# Patient Record
Sex: FEMALE | Race: ASIAN | Employment: FULL TIME | URBAN - METROPOLITAN AREA
[De-identification: names, ages, dates, MRNs, and addresses within clinical notes are randomized per-mention and may not be internally consistent; named-entity substitution may affect disease eponyms.]

---

## 2021-08-18 ENCOUNTER — VIRTUAL VISIT (OUTPATIENT)
Dept: FAMILY MEDICINE CLINIC | Age: 41
End: 2021-08-18
Payer: COMMERCIAL

## 2021-08-18 DIAGNOSIS — Z12.31 BREAST CANCER SCREENING BY MAMMOGRAM: ICD-10-CM

## 2021-08-18 DIAGNOSIS — Z13.1 SCREENING FOR DIABETES MELLITUS: ICD-10-CM

## 2021-08-18 DIAGNOSIS — E89.0 POSTOPERATIVE HYPOTHYROIDISM: Primary | ICD-10-CM

## 2021-08-18 DIAGNOSIS — Z85.850 HISTORY OF THYROID CANCER: ICD-10-CM

## 2021-08-18 DIAGNOSIS — Z30.431 IUD CHECK UP: ICD-10-CM

## 2021-08-18 DIAGNOSIS — Z13.220 LIPID SCREENING: ICD-10-CM

## 2021-08-18 PROCEDURE — 99204 OFFICE O/P NEW MOD 45 MIN: CPT | Performed by: FAMILY MEDICINE

## 2021-08-18 RX ORDER — LEVOTHYROXINE SODIUM 112 UG/1
112 TABLET ORAL DAILY
Qty: 30 TABLET | Refills: 0 | Status: SHIPPED | OUTPATIENT
Start: 2021-08-18 | End: 2021-10-04 | Stop reason: SDUPTHER

## 2021-08-18 RX ORDER — LEVOTHYROXINE SODIUM 112 UG/1
112 TABLET ORAL DAILY
COMMUNITY
End: 2021-08-18 | Stop reason: SDUPTHER

## 2021-08-18 SDOH — ECONOMIC STABILITY: FOOD INSECURITY: WITHIN THE PAST 12 MONTHS, THE FOOD YOU BOUGHT JUST DIDN'T LAST AND YOU DIDN'T HAVE MONEY TO GET MORE.: NEVER TRUE

## 2021-08-18 SDOH — ECONOMIC STABILITY: FOOD INSECURITY: WITHIN THE PAST 12 MONTHS, YOU WORRIED THAT YOUR FOOD WOULD RUN OUT BEFORE YOU GOT MONEY TO BUY MORE.: NEVER TRUE

## 2021-08-18 ASSESSMENT — PATIENT HEALTH QUESTIONNAIRE - PHQ9
SUM OF ALL RESPONSES TO PHQ QUESTIONS 1-9: 0
2. FEELING DOWN, DEPRESSED OR HOPELESS: 0
SUM OF ALL RESPONSES TO PHQ QUESTIONS 1-9: 0
SUM OF ALL RESPONSES TO PHQ9 QUESTIONS 1 & 2: 0
SUM OF ALL RESPONSES TO PHQ QUESTIONS 1-9: 0
1. LITTLE INTEREST OR PLEASURE IN DOING THINGS: 0

## 2021-08-18 ASSESSMENT — ENCOUNTER SYMPTOMS
CONSTIPATION: 0
RHINORRHEA: 0
DIARRHEA: 0
SORE THROAT: 0
ABDOMINAL PAIN: 0
CHEST TIGHTNESS: 0
SHORTNESS OF BREATH: 0

## 2021-08-18 ASSESSMENT — SOCIAL DETERMINANTS OF HEALTH (SDOH): HOW HARD IS IT FOR YOU TO PAY FOR THE VERY BASICS LIKE FOOD, HOUSING, MEDICAL CARE, AND HEATING?: NOT HARD AT ALL

## 2021-08-18 NOTE — PROGRESS NOTES
Subjective:   Patient ID: Drake Russell is a 35 y.o. female here today to establish care. HPI by clinical support staff:   Chief Complaint   Patient presents with    New Patient     Patient just moved here, just wants a general physical and she needs a referral to a endoconologist      Preliminary data above this line collected by clinical support staff.    ______________________________________________________________________  HPI by Provider:   HPI   Patient presents via virtual visit to establish care. History reviewed and updated with patient today. Reports a history of thyroid cancer (unsure which kind)  in 2014 surgically removed status post radioactive ablation in [place]. Currently on levothyroxine 112 mcg daily, looking to establish with endocrinology  And also has Mirena in place would like to get a gynecologist. Requesting thorough physical plus labs. Never had a mammogram- due for pap smear- never abnormal.    Data above this line collected by Provider. Patient's medications, allergies, past medical, surgical, social and family histories were reviewed and updated as appropriate. Patient Care Team:  Malik Ramírez MD as PCP - General (Family Medicine)    No Known Allergies  No current outpatient medications on file prior to visit. No current facility-administered medications on file prior to visit. Review of Systems   Constitutional: Negative for activity change, appetite change, fatigue and fever. HENT: Negative for congestion, rhinorrhea and sore throat. Respiratory: Negative for chest tightness and shortness of breath. Cardiovascular: Negative for chest pain, palpitations and leg swelling. Gastrointestinal: Negative for abdominal pain, constipation and diarrhea. Genitourinary: Negative for dysuria and frequency. Musculoskeletal: Negative for arthralgias. Neurological: Negative for dizziness, weakness and headaches.    Psychiatric/Behavioral: Negative for hallucinations. All other systems reviewed and are negative. ROS above this line reviewed by Provider. Objective: There were no vitals taken for this visit. Physical Exam  Nursing note reviewed. Constitutional:       General: She is not in acute distress. Appearance: Normal appearance. She is normal weight. She is not ill-appearing, toxic-appearing or diaphoretic. Comments: Well groomed   HENT:      Head: Normocephalic and atraumatic. Pulmonary:      Effort: Pulmonary effort is normal.      Comments: Speaking in full sentences  Musculoskeletal:      Cervical back: Normal range of motion. Neurological:      Mental Status: She is alert. Psychiatric:         Mood and Affect: Mood normal.         Behavior: Behavior normal.         Thought Content: Thought content normal.       No results found for: WBC, HGB, HCT, MCV, PLT  No results found for: NA, K, BUN, CREATININE, GLUCOSE, CALCIUM, BILITOT, ALKPHOS, AST, ALT, GFRAA  No results found for: TSHFT4, TSH, FT3  No results found for: LABA1C  No results found for: EAG  No results found for: CHOL, TRIG, HDL, LDLCHOLESTEROL, CHOLHDLRATIO  No results found for: LABMICR, HNNE18CPZ  No results found for: VITD25  Assessment and Plan:   1. Postoperative hypothyroidism  Status post thyroidectomy and radioablation  in 2014- unsure which type of cancer.   - CBC Auto Differential; Future  - Comprehensive Metabolic Panel; Future  - TSH WITH REFLEX TO FT4; Future  - Raphael Plata MD, Endocrinology, [facility]    2. History of thyroid cancer  Diagnosed in 2014-Status post thyroidectomy and radioablation  in 2014- unsure which type of cancer. Will bring records from [place] and [address] [place] [address]. - CBC Auto Differential; Future  - Comprehensive Metabolic Panel; Future    3. IUD check up  Mirena in situ x 3-4 years. - Korey Lezama MD, Gynecology, [facility]    4.  Breast cancer screening by mammogram  - NICK DIGITAL SCREEN W OR WO CAD BILATERAL; Future    5. Lipid screening  - Lipid Panel; Future    6. Screening for diabetes mellitus  Positive family history. - Hemoglobin A1C; Future       Misty Palm  is a 35 y.o. female being evaluated by a Virtual Visit (video visit) encounter to address concerns as mentioned above. A caregiver was present when appropriate. Due to this being a TeleHealth encounter (During [facility] public health emergency), evaluation of the following organ systems was limited: Vitals/Constitutional/EENT/Resp/CV/GI//MS/Neuro/Skin/Heme-Lymph-Imm. Pursuant to the emergency declaration under the [address] [place] [address], [address] [place] [address] authority and the [employer] and [employer] Act, this Virtual Visit was conducted with patient's (and/or legal guardian's) consent, to reduce the patient's risk of exposure to COVID-19 and provide necessary medical care. The patient (and/or legal guardian) has also been advised to contact this office for worsening conditions or problems, and seek emergency medical treatment and/or call 911 if deemed necessary. Patient identification was verified at the start of the visit: Yes    Total time spent for this encounter: Not billed by time    Services were provided through a video synchronous discussion virtually to substitute for in-person clinic visit. Patient  located at their individual homes. This chart note was prepared using a voice recognition dictation program. This note was reviewed for accuracy; however, addition, deletion and sound-alike word errors may occur. If there are any questions regarding this chart note, please contact the originating provider. Electronically signed by   Marilee Watts MD  8/18/2021   12:30 PM    No follow-ups on file.

## 2021-10-04 ENCOUNTER — VIRTUAL VISIT (OUTPATIENT)
Dept: ENDOCRINOLOGY | Age: 41
End: 2021-10-04
Payer: COMMERCIAL

## 2021-10-04 DIAGNOSIS — E89.0 POST-SURGICAL HYPOTHYROIDISM: Primary | ICD-10-CM

## 2021-10-04 DIAGNOSIS — Z92.3 HX OF RADIOACTIVE IODINE THYROID ABLATION: ICD-10-CM

## 2021-10-04 DIAGNOSIS — Z85.850 HISTORY OF THYROID CANCER: ICD-10-CM

## 2021-10-04 PROCEDURE — 99244 OFF/OP CNSLTJ NEW/EST MOD 40: CPT | Performed by: INTERNAL MEDICINE

## 2021-10-04 RX ORDER — LEVOTHYROXINE SODIUM 0.12 MG/1
125 TABLET ORAL DAILY
Qty: 90 TABLET | Refills: 1 | Status: SHIPPED | OUTPATIENT
Start: 2021-10-04 | End: 2021-12-07 | Stop reason: SDUPTHER

## 2021-10-04 NOTE — PROGRESS NOTES
Patient ID: Justo Erickson is a 35 y.o. female    Chief Complaint: post surgical hypothyroidism, thyroid cancer      Referred by Dr. Ion Lincoln MD     Pursuant to the emergency declaration under the [facility], [facility] waiver authority and the [employer] and [employer] Act this visit was conducted after obtaining verbal consent, with the use of [employer]. me interactive audio and video telecommunications system that permits real time communication between the patient and provider to substitute for an in-person clinic visit to reduce the patient's risk of exposure to COVID-19 and provide necessary medical care. Because this was a Telehealth visit, evaluation of the following organ systems was limited: Vitals, Constitutional, EENT, Resp, CV, GI, , MS, Neuro, Skin. Heme. Lymph, Imm. Justo Erickson was at home. Provider was at [facility] office. No one else was involved. The patient has also been advised to contact this office for worsening conditions or problems, and seek emergency medical treatment and/or call 911 if deemed necessary. HPI:   Justo Erickson is here for initial evaluation of postoperative hypothyroidism. 2014: She had total thyroidectomy in [place] (first affiliated [address])   LEE in 2014     She was seeing endocrinologist in [place]. Move din Nov 2020. Takes Levothyroxine 112 mcg daily in morning empty stomach with water and waits for 30 minutes.      Energy levels are low   Weight loss on intermittent fasting   She thinks she has tremors and poor sleep   No heat / cold intolerance   Mild depression /anxiety   Denies excessive sweating, tremors, palpitations    No neck pain, denies compressive neck symptoms   No menstrual cycles - she has mirena   No plans to conceive     Family history of thyroid disorder: None     No recent iodine loading in form of contrast material for diagnostic studies/cardiac cath  No Food supplements, herbs or medications including Biotin  No recent URTI      The following portions of the patient's history were reviewed and updated as appropriate:       Family History   Problem Relation Age of Onset    Asthma Mother     Diabetes Mother         Steroid induced    High Blood Pressure Father     No Known Problems Brother     No Known Problems Maternal Grandmother     No Known Problems Maternal Grandfather     No Known Problems Paternal Grandmother     No Known Problems Paternal Grandfather     No Known Problems Son     No Known Problems Daughter             Social History     Socioeconomic History    Marital status:      Spouse name: Not on file    Number of children: Not on file    Years of education: Not on file    Highest education level: Not on file   Occupational History    Not on file   Tobacco Use    Smoking status: Never Smoker    Smokeless tobacco: Never Used   Vaping Use    Vaping Use: Never used   Substance and Sexual Activity    Alcohol use: Never    Drug use: Never    Sexual activity: Yes     Partners: Male     Birth control/protection: I.U.D. Other Topics Concern    Not on file   Social History Narrative    Not on file     Social Determinants of Health     Financial Resource Strain: Low Risk     Difficulty of Paying Living Expenses: Not hard at all   Food Insecurity: No Food Insecurity    Worried About Running Out of Food in the Last Year: Never true    [address] in the Last Year: Never true   Transportation Needs:     Lack of Transportation (Medical):      Lack of Transportation (Non-Medical):    Physical Activity:     Days of Exercise per Week:     Minutes of Exercise per Session:    Stress:     Feeling of Stress :    Social Connections:     Frequency of Communication with Friends and Family:     Frequency of Social Gatherings with Friends and Family:     Attends Baptism Services:     Active Member of Clubs or Organizations:     Attends Club or Organization Meetings:     Marital Status:    Intimate Partner Violence:     Fear of Current or Ex-Partner:     Emotionally Abused:     Physically Abused:     Sexually Abused:            Past Medical History:   Diagnosis Date    Acquired hypothyroidism 2014    Thyroidectomy    History of thyroid cancer 2014    Hx of radioactive iodine thyroid ablation     IUD (intrauterine device) in place     Kidney stone          Past Surgical History:   Procedure Laterality Date    LITHOTRIPSY Left 2020    THYROIDECTOMY           No Known Allergies        Current Outpatient Medications:     levothyroxine (SYNTHROID) 125 MCG tablet, Take 1 tablet by mouth Daily, Disp: 90 tablet, Rfl: 1      Review of Systems:  Constitutional: Negative for fever, chills. HENT: Negative for congestion, ear pain, rhinorrhea,  sore throat and trouble swallowing. Eyes: Negative for photophobia, redness, itching. Respiratory: Negative for cough, shortness of breath and sputum. Cardiovascular: Negative for chest pain and leg swelling. Gastrointestinal: Negative for nausea, vomiting, abdominal pain, diarrhea, constipation. Endocrine: Negative for polydipsia, polyphagia and polyuria. Genitourinary: Negative for dysuria, urgency, frequency, hematuria and flank pain. Musculoskeletal: Negative for myalgias, back pain, arthralgias. Skin/Nail: Negative for rash, itching. Normal nails. Neurological: Negative for seizures, light-headedness, numbness and headaches. Hematological/ Lymph nodes: Negative for adenopathy. Does not bruise/bleed easily. Psychiatric/Behavioral: Negative for suicidal ideas and decreased concentration. Physical Exam:  There were no vitals taken for this visit.         Lab Review:    Orders Only on 08/20/2021   Component Date Value Ref Range Status    Cholesterol, Total 08/20/2021 220* 0 - 199 mg/dL Final    Triglycerides 08/20/2021 118  0 - 150 mg/dL Final    HDL 08/20/2021 60  40 - 60 mg/dL Final    LDL Calculated 08/20/2021 136* <100 mg/dL Final    VLDL Cholesterol Calculated 08/20/2021 24  Not Established mg/dL Final    TSH Reflex FT4 08/20/2021 2.68  0.27 - 4.20 uIU/mL Final    Hemoglobin A1C 08/20/2021 5.5  See comment % Final    eAG 08/20/2021 111.2  mg/dL Final    Sodium 08/20/2021 140  136 - 145 mmol/L Final    Potassium 08/20/2021 4.1  3.5 - 5.1 mmol/L Final    Chloride 08/20/2021 103  99 - 110 mmol/L Final    CO2 08/20/2021 21  21 - 32 mmol/L Final    Anion Gap 08/20/2021 16  3 - 16 Final    Glucose 08/20/2021 73  70 - 99 mg/dL Final    BUN 08/20/2021 9  7 - 20 mg/dL Final    CREATININE 08/20/2021 0.7  0.6 - 1.1 mg/dL Final    GFR Non- 08/20/2021 >60  >60 Final    GFR  08/20/2021 >60  >60 Final    Calcium 08/20/2021 8.6  8.3 - 10.6 mg/dL Final    Total Protein 08/20/2021 7.0  6.4 - 8.2 g/dL Final    Albumin 08/20/2021 4.3  3.4 - 5.0 g/dL Final    Albumin/Globulin Ratio 08/20/2021 1.6  1.1 - 2.2 Final    Total Bilirubin 08/20/2021 1.0  0.0 - 1.0 mg/dL Final    Alkaline Phosphatase 08/20/2021 66  40 - 129 U/L Final    ALT 08/20/2021 16  10 - 40 U/L Final    AST 08/20/2021 16  15 - 37 U/L Final    Globulin 08/20/2021 2.7  g/dL Final    WBC 08/20/2021 7.8  4.0 - 11.0 K/uL Final    RBC 08/20/2021 4.51  4.00 - 5.20 M/uL Final    Hemoglobin 08/20/2021 12.9  12.0 - 16.0 g/dL Final    Hematocrit 08/20/2021 38.1  36.0 - 48.0 % Final    MCV 08/20/2021 84.5  80.0 - 100.0 fL Final    MCH 08/20/2021 28.7  26.0 - 34.0 pg Final    MCHC 08/20/2021 33.9  31.0 - 36.0 g/dL Final    RDW 08/20/2021 13.5  12.4 - 15.4 % Final    Platelets 53/53/5037 263  135 - 450 K/uL Final    MPV 08/20/2021 7.7  5.0 - 10.5 fL Final    Neutrophils % 08/20/2021 59.4  % Final    Lymphocytes % 08/20/2021 29.2  % Final    Monocytes % 08/20/2021 7.0  % Final    Eosinophils % 08/20/2021 3.6  % Final    Basophils % 08/20/2021 0.8  % Final    Neutrophils Absolute 08/20/2021 4.6  1.7 - 7.7 K/uL Final    Lymphocytes Absolute 08/20/2021 2.3  1.0 - 5.1 K/uL Final    Monocytes Absolute 08/20/2021 0.5  0.0 - 1.3 K/uL Final    Eosinophils Absolute 08/20/2021 0.3  0.0 - 0.6 K/uL Final    Basophils Absolute 08/20/2021 0.1  0.0 - 0.2 K/uL Final        No results found. Assessment/Plan:     Jamie Fairbanks was seen today for consultation and hypothyroidism. Diagnoses and all orders for this visit:    Post-surgical hypothyroidism  -     levothyroxine (SYNTHROID) 125 MCG tablet; Take 1 tablet by mouth Daily  -     TSH without Reflex; Future  -     T4, Free; Future  -     Thyroglobulin; Future  -     Anti-Thyroglobulin Antibody; Future  -     US HEAD NECK SOFT TISSUE THYROID; Future    Hx of radioactive iodine thyroid ablation  -     TSH without Reflex; Future  -     T4, Free; Future  -     Thyroglobulin; Future  -     Anti-Thyroglobulin Antibody; Future  -     US HEAD NECK SOFT TISSUE THYROID; Future    History of thyroid cancer  -     TSH without Reflex; Future  -     T4, Free; Future  -     Thyroglobulin; Future  -     Anti-Thyroglobulin Antibody; Future  -     US HEAD NECK SOFT TISSUE THYROID; Future        1: Thyroid cancer     Need to records from [place] . She will try to translate     Will ask nurse to get SHANNON signed and get records from [facility]   She saw Dr. Frank Ferguson. MD Adolfo    Tg ,anti Tg, US neck now     2: Postsurgical hypothyroidism    TSH target depends on stage of cancer . Importance of getting all the records discussd     TSH 2.8 - Aug 2021     Increase levothyroxine to 125 mcg daily in am      Education: Reviewed how to properly take thyroid replacement. Instructed to take daily with water on an empty stomach without concomitant vitamins or calcium or other medicine. Wait for 30-45 minutes before eating.  If patient is confident they missed a day of pills, they can take the missed dose with their next tablet (only for levothyroxine).      RTC in 2 months in person (40 minutes)   Labs before next visit     Electronically signed by Zev Isidro MD on 10/4/2021 at 3:59 PM

## 2021-10-04 NOTE — LETTER
[facility] Endocrinology  [address] [address]  Phone: [phone]  Fax: [phone]    Nancy Valdovinos MD    October 4, 2021     Trever Lees MD  [facility] [address] [place] [address]    Patient: Nav Yoo   MR Number: <[id number]>   YOB: 1980   Date of Visit: 10/4/2021       Dear Trever Lees: Thank you for referring Nav Yoo to me for evaluation/treatment. Below are the relevant portions of my assessment and plan of care. If you have questions, please do not hesitate to call me. I look forward to following Jenelle along with you.     Sincerely,      Nancy Valdovinos MD

## 2021-10-12 ENCOUNTER — TELEPHONE (OUTPATIENT)
Dept: ENDOCRINOLOGY | Age: 41
End: 2021-10-12

## 2021-10-12 NOTE — TELEPHONE ENCOUNTER
----- Message from April Monument, [place] sent at 10/5/2021  5:40 PM EDT -----  Obtain medical records from [address] [place] [address]  May need SHANNON signed by raheem

## 2021-10-15 ENCOUNTER — CLINICAL DOCUMENTATION (OUTPATIENT)
Dept: ENDOCRINOLOGY | Age: 41
End: 2021-10-15

## 2021-10-15 NOTE — PROGRESS NOTES
Reviewed records from [facility]. Most of them were from GYN practice.     The note suggested she had a left sided 1.6 x 1 cms nodule   US neck in Dec 2015 did not show any residual thyroid mass, no abnormal lymph nodes   US neck in March 2017 did not show any residual thyroid mass, no abnormal lymph nodes   Tg < 0.1, anti Tg antibody < 1 in May 2019 when TSH was 26.42

## 2021-10-22 ENCOUNTER — HOSPITAL ENCOUNTER (OUTPATIENT)
Dept: ULTRASOUND IMAGING | Age: 41
Discharge: HOME | End: 2021-10-22
Payer: COMMERCIAL

## 2021-10-22 DIAGNOSIS — Z92.3 HX OF RADIOACTIVE IODINE THYROID ABLATION: ICD-10-CM

## 2021-10-22 DIAGNOSIS — Z85.850 HISTORY OF THYROID CANCER: ICD-10-CM

## 2021-10-22 DIAGNOSIS — E89.0 POST-SURGICAL HYPOTHYROIDISM: ICD-10-CM

## 2021-10-22 PROCEDURE — 76536 US EXAM OF HEAD AND NECK: CPT

## 2021-11-30 DIAGNOSIS — Z85.850 HISTORY OF THYROID CANCER: ICD-10-CM

## 2021-11-30 DIAGNOSIS — Z92.3 HX OF RADIOACTIVE IODINE THYROID ABLATION: ICD-10-CM

## 2021-11-30 DIAGNOSIS — E89.0 POST-SURGICAL HYPOTHYROIDISM: ICD-10-CM

## 2021-11-30 LAB
ANTI-THYROGLOB ABS: 14 IU/ML
T4 FREE: 1.9 NG/DL (ref 0.9–1.8)
TSH SERPL DL<=0.05 MIU/L-ACNC: 0.11 UIU/ML (ref 0.27–4.2)

## 2021-12-05 LAB — THYROGLOBULIN BY LC-MS/MS, SERUM/PLASMA: <0.5 NG/ML (ref 1.3–31.8)

## 2021-12-07 ENCOUNTER — OFFICE VISIT (OUTPATIENT)
Dept: ENDOCRINOLOGY | Age: 41
End: 2021-12-07
Payer: COMMERCIAL

## 2021-12-07 VITALS
DIASTOLIC BLOOD PRESSURE: 85 MMHG | BODY MASS INDEX: 34.12 KG/M2 | HEART RATE: 86 BPM | HEIGHT: 63 IN | OXYGEN SATURATION: 97 % | SYSTOLIC BLOOD PRESSURE: 123 MMHG | WEIGHT: 192.6 LBS

## 2021-12-07 DIAGNOSIS — E89.0 POST-SURGICAL HYPOTHYROIDISM: Primary | ICD-10-CM

## 2021-12-07 DIAGNOSIS — Z85.850 HISTORY OF THYROID CANCER: ICD-10-CM

## 2021-12-07 DIAGNOSIS — Z92.3 HX OF RADIOACTIVE IODINE THYROID ABLATION: ICD-10-CM

## 2021-12-07 PROCEDURE — 99214 OFFICE O/P EST MOD 30 MIN: CPT | Performed by: INTERNAL MEDICINE

## 2021-12-07 RX ORDER — LEVOTHYROXINE SODIUM 0.12 MG/1
TABLET ORAL
Qty: 90 TABLET | Refills: 3 | Status: SHIPPED | OUTPATIENT
Start: 2021-12-07

## 2021-12-07 NOTE — PROGRESS NOTES
Patient ID: Alethea Toledo is a 35 y.o. female    Chief Complaint: post surgical hypothyroidism, thyroid cancer       HPI:   Alethea Toledo is here for an evaluation of postoperative hypothyroidism. 2014: She had total thyroidectomy in [place] (first affiliated [address])   LEE in 2014     She was seeing endocrinologist in [place]. Moved in Nov 2020. Reviewed records from [facility]. Most of them were from GYN practice. The note suggested she had a left sided 1.6 x 1 cms nodule   US neck in Dec 2015 did not show any residual thyroid mass, no abnormal lymph nodes   US neck in March 2017 did not show any residual thyroid mass, no abnormal lymph nodes   Tg < 0.1, anti Tg antibody < 1 in May 2019 when TSH was 26.42     Takes Levothyroxine 125 mcg daily in morning empty stomach with water and waits for 30 minutes. Energy levels are good   Weight stable    She thinks she has occasional tremors  Sleep is better   No heat / cold intolerance   Mild depression /anxiety - it is improving   Denies excessive sweating, tremors, palpitations    No neck pain, denies compressive neck symptoms   No menstrual cycles - she has mirena   No plans to conceive     Family history of thyroid disorder: None     No recent iodine loading in form of contrast material for diagnostic studies/cardiac cath  Takes MVT prn.  No Food supplements, herbs or medications including Biotin  No recent URTI      The following portions of the patient's history were reviewed and updated as appropriate:       Family History   Problem Relation Age of Onset    Asthma Mother     Diabetes Mother         Steroid induced    High Blood Pressure Father     No Known Problems Brother     No Known Problems Maternal Grandmother     No Known Problems Maternal Grandfather     No Known Problems Paternal Grandmother     No Known Problems Paternal Grandfather     No Known Problems Son     No Known Problems Daughter Social History     Socioeconomic History    Marital status:      Spouse name: Not on file    Number of children: Not on file    Years of education: Not on file    Highest education level: Not on file   Occupational History    Not on file   Tobacco Use    Smoking status: Never Smoker    Smokeless tobacco: Never Used   Vaping Use    Vaping Use: Never used   Substance and Sexual Activity    Alcohol use: Never    Drug use: Never    Sexual activity: Yes     Partners: Male     Birth control/protection: I.U.D. Other Topics Concern    Not on file   Social History Narrative    Not on file     Social Determinants of Health     Financial Resource Strain: Low Risk     Difficulty of Paying Living Expenses: Not hard at all   Food Insecurity: No Food Insecurity    Worried About Running Out of Food in the Last Year: Never true    [address] in the Last Year: Never true   Transportation Needs:     Lack of Transportation (Medical): Not on file    Lack of Transportation (Non-Medical):  Not on file   Physical Activity:     Days of Exercise per Week: Not on file    Minutes of Exercise per Session: Not on file   Stress:     Feeling of Stress : Not on file   Social Connections:     Frequency of Communication with Friends and Family: Not on file    Frequency of Social Gatherings with Friends and Family: Not on file    Attends Orthodox Services: Not on file    Active Member of [address] [place] [address] or Organizations: Not on file    Attends Club or Organization Meetings: Not on file    Marital Status: Not on file   Intimate Partner Violence:     Fear of Current or Ex-Partner: Not on file    Emotionally Abused: Not on file    Physically Abused: Not on file    Sexually Abused: Not on file   Housing Stability:     Unable to Pay for Housing in the Last Year: Not on file    Number of [place] in the Last Year: Not on file    Unstable Housing in the Last Year: Not on file           Past Medical History: Diagnosis Date    Acquired hypothyroidism 2014    Thyroidectomy    History of thyroid cancer 2014    Hx of radioactive iodine thyroid ablation     IUD (intrauterine device) in place     Kidney stone          Past Surgical History:   Procedure Laterality Date    LITHOTRIPSY Left 2020    THYROIDECTOMY           No Known Allergies        Current Outpatient Medications:     Misc. Devices ([employer] PILL SPLITTER) MISC, To split levothyroxine, Disp: 1 each, Rfl: 0    levothyroxine (SYNTHROID) 125 MCG tablet, Take one tab six days a week and half on Sunday, Disp: 90 tablet, Rfl: 3      Review of Systems:  Constitutional: Negative for fever, chills. HENT: Negative for congestion, ear pain, rhinorrhea,  sore throat and trouble swallowing. Eyes: Negative for photophobia, redness, itching. Respiratory: Negative for cough, shortness of breath and sputum. Cardiovascular: Negative for chest pain and leg swelling. Gastrointestinal: Negative for nausea, vomiting, abdominal pain, diarrhea, constipation. Endocrine: Negative for polydipsia, polyphagia and polyuria. Genitourinary: Negative for dysuria, urgency, frequency, hematuria and flank pain. Musculoskeletal: Negative for myalgias, back pain, arthralgias. Skin/Nail: Negative for rash, itching. Normal nails. Neurological: Negative for seizures, light-headedness, numbness and headaches. Hematological/ Lymph nodes: Negative for adenopathy. Does not bruise/bleed easily. Psychiatric/Behavioral: Negative for suicidal ideas and decreased concentration. Physical Exam:  /85 (Site: Right Upper Arm, Position: Sitting, Cuff Size: Large Adult)   Pulse 86   Ht 5' 3\" (1.6 m)   Wt 192 lb 9.6 oz (87.4 kg)   SpO2 97%   BMI 34.12 kg/m²       Constitutional: Patient is oriented to person, place, and time. Patient appears well-developed and well-nourished. HENT:    Head: Normocephalic and atraumatic.      Eyes: Conjunctivae and EOM are normal. Final    Albumin 08/20/2021 4.3  3.4 - 5.0 g/dL Final    Albumin/Globulin Ratio 08/20/2021 1.6  1.1 - 2.2 Final    Total Bilirubin 08/20/2021 1.0  0.0 - 1.0 mg/dL Final    Alkaline Phosphatase 08/20/2021 66  40 - 129 U/L Final    ALT 08/20/2021 16  10 - 40 U/L Final    AST 08/20/2021 16  15 - 37 U/L Final    Globulin 08/20/2021 2.7  g/dL Final    WBC 08/20/2021 7.8  4.0 - 11.0 K/uL Final    RBC 08/20/2021 4.51  4.00 - 5.20 M/uL Final    Hemoglobin 08/20/2021 12.9  12.0 - 16.0 g/dL Final    Hematocrit 08/20/2021 38.1  36.0 - 48.0 % Final    MCV 08/20/2021 84.5  80.0 - 100.0 fL Final    MCH 08/20/2021 28.7  26.0 - 34.0 pg Final    MCHC 08/20/2021 33.9  31.0 - 36.0 g/dL Final    RDW 08/20/2021 13.5  12.4 - 15.4 % Final    Platelets 09/34/0080 263  135 - 450 K/uL Final    MPV 08/20/2021 7.7  5.0 - 10.5 fL Final    Neutrophils % 08/20/2021 59.4  % Final    Lymphocytes % 08/20/2021 29.2  % Final    Monocytes % 08/20/2021 7.0  % Final    Eosinophils % 08/20/2021 3.6  % Final    Basophils % 08/20/2021 0.8  % Final    Neutrophils Absolute 08/20/2021 4.6  1.7 - 7.7 K/uL Final    Lymphocytes Absolute 08/20/2021 2.3  1.0 - 5.1 K/uL Final    Monocytes Absolute 08/20/2021 0.5  0.0 - 1.3 K/uL Final    Eosinophils Absolute 08/20/2021 0.3  0.0 - 0.6 K/uL Final    Basophils Absolute 08/20/2021 0.1  0.0 - 0.2 K/uL Final        No results found. Assessment/Plan:     Corrie Waller was seen today for hypothyroidism. Diagnoses and all orders for this visit:    Post-surgical hypothyroidism  -     Misc. Devices (CVS PILL SPLITTER) MISC; To split levothyroxine  -     levothyroxine (SYNTHROID) 125 MCG tablet; Take one tab six days a week and half on Sunday  -     TSH without Reflex;  Future  -     T4, Free; Future    Hx of radioactive iodine thyroid ablation    History of thyroid cancer        1: Thyroid cancer   Tg < 0.5, anti Tg ab 14 - Nov 2021     US neck - Oct 2021:   Normal looking lymph nodes, no mass or sign of recurrence of thyroid cancer. Repeat US neck and Tg in fall-winter 2022     2: Postsurgical hypothyroidism    TSH 0.11, Free T4 1.9 - Nov 2021     Change Levothyroxine to 125 mcg, one tab six days a week and half tab on Sunday      Education: Reviewed how to properly take thyroid replacement. Instructed to take daily with water on an empty stomach without concomitant vitamins or calcium or other medicine. Wait for 30-45 minutes before eating. If patient is confident they missed a day of pills, they can take the missed dose with their next tablet (only for levothyroxine).      RTC in 6 months with TFTs     Electronically signed by Ja Ramirez MD on 12/7/2021 at 2:05 PM

## 2022-02-09 ENCOUNTER — TELEPHONE (OUTPATIENT)
Dept: ENDOCRINOLOGY | Age: 42
End: 2022-02-09

## 2022-02-09 DIAGNOSIS — E89.0 POST-SURGICAL HYPOTHYROIDISM: Primary | ICD-10-CM

## 2022-02-09 NOTE — TELEPHONE ENCOUNTER
Pt called stating she is not sleeping well, has anxiety and depression. I advised this is normally treated by pcp but pt wanted me to send a message to ask if this is related to thyroid?

## 2022-11-15 ENCOUNTER — OFFICE VISIT (OUTPATIENT)
Dept: FAMILY MEDICINE CLINIC | Age: 42
End: 2022-11-15
Payer: COMMERCIAL

## 2022-11-15 VITALS
HEART RATE: 91 BPM | WEIGHT: 200.4 LBS | HEIGHT: 63 IN | SYSTOLIC BLOOD PRESSURE: 130 MMHG | BODY MASS INDEX: 35.51 KG/M2 | OXYGEN SATURATION: 97 % | DIASTOLIC BLOOD PRESSURE: 70 MMHG

## 2022-11-15 DIAGNOSIS — B36.0 TINEA VERSICOLOR: ICD-10-CM

## 2022-11-15 DIAGNOSIS — Z92.0 HISTORY OF USE OF CONTRACEPTIVE INTRAUTERINE DEVICE (IUD): ICD-10-CM

## 2022-11-15 DIAGNOSIS — Z85.850 HISTORY OF THYROID CANCER: ICD-10-CM

## 2022-11-15 DIAGNOSIS — Z00.00 WELL ADULT EXAM: Primary | ICD-10-CM

## 2022-11-15 DIAGNOSIS — E89.0 POSTOPERATIVE HYPOTHYROIDISM: ICD-10-CM

## 2022-11-15 PROCEDURE — 99396 PREV VISIT EST AGE 40-64: CPT | Performed by: FAMILY MEDICINE

## 2022-11-15 RX ORDER — KETOCONAZOLE 20 MG/ML
SHAMPOO TOPICAL
Qty: 120 ML | Refills: 1 | Status: SHIPPED | OUTPATIENT
Start: 2022-11-15

## 2022-11-15 SDOH — ECONOMIC STABILITY: FOOD INSECURITY: WITHIN THE PAST 12 MONTHS, YOU WORRIED THAT YOUR FOOD WOULD RUN OUT BEFORE YOU GOT MONEY TO BUY MORE.: NEVER TRUE

## 2022-11-15 SDOH — ECONOMIC STABILITY: FOOD INSECURITY: WITHIN THE PAST 12 MONTHS, THE FOOD YOU BOUGHT JUST DIDN'T LAST AND YOU DIDN'T HAVE MONEY TO GET MORE.: NEVER TRUE

## 2022-11-15 ASSESSMENT — PATIENT HEALTH QUESTIONNAIRE - PHQ9
SUM OF ALL RESPONSES TO PHQ QUESTIONS 1-9: 0
SUM OF ALL RESPONSES TO PHQ QUESTIONS 1-9: 0
SUM OF ALL RESPONSES TO PHQ9 QUESTIONS 1 & 2: 0
1. LITTLE INTEREST OR PLEASURE IN DOING THINGS: 0
2. FEELING DOWN, DEPRESSED OR HOPELESS: 0
SUM OF ALL RESPONSES TO PHQ QUESTIONS 1-9: 0
SUM OF ALL RESPONSES TO PHQ QUESTIONS 1-9: 0

## 2022-11-15 ASSESSMENT — SOCIAL DETERMINANTS OF HEALTH (SDOH): HOW HARD IS IT FOR YOU TO PAY FOR THE VERY BASICS LIKE FOOD, HOUSING, MEDICAL CARE, AND HEATING?: NOT HARD AT ALL

## 2022-11-15 ASSESSMENT — ENCOUNTER SYMPTOMS: RESPIRATORY NEGATIVE: 1

## 2022-11-15 NOTE — PROGRESS NOTES
Chiquis Higuera (:  1980) is a 38 y.o. female,Established patient, here for evaluation of the following chief complaint(s):  New Patient         ASSESSMENT/PLAN:  Karla Santacruz was seen today for new patient. Diagnoses and all orders for this visit:    Well adult exam  -     Lipid Panel; Future  -     Comprehensive Metabolic Panel; Future  -     CBC; Future  Reviewed diet and exercise  Postoperative hypothyroidism  Recheck  to see if stable and will follow up with endo  History of thyroid cancer  -     US THYROID; Future    History of use of contraceptive intrauterine device (IUD)  -     Tanika Nieto MD, Gynecology, Courtney Peterson MD, Dermatology, [facility]  Trial of ketoconazole  Other orders  -     ketoconazole (NIZORAL) 2 % shampoo; Apply topically to affected areas 3 times weekly       No follow-ups on file. Subjective   SUBJECTIVE/OBJECTIVE:  HPI  Pt is a of 38 y.o. female comes in today with   Chief Complaint   Patient presents with    New Patient   White patches on skin  Overdue for thyroid check. Struggled with wt loss    Vitals:    11/15/22 1412   BP: 130/70   Site: Left Upper Arm   Position: Sitting   Cuff Size: Small Adult   Pulse: 91   SpO2: 97%   Weight: 200 lb 6.4 oz (90.9 kg)   Height: 5' 3.1\" (1.603 m)       Past Medical History:Reviewed  Medications:Reviewed. No Known Allergies   Social hx:Reviewed. Social History     Tobacco Use   Smoking Status Never   Smokeless Tobacco Never     Review of Systems   Constitutional: Negative. Respiratory: Negative. Cardiovascular: Negative. Objective   Physical Exam  Constitutional:       General: She is not in acute distress. Appearance: She is well-developed. She is not diaphoretic. HENT:      Head: Normocephalic and atraumatic. Eyes:      General: No scleral icterus. Neck:      Thyroid: No thyroid mass or thyromegaly. Trachea: No tracheal deviation. Cardiovascular:      Rate and Rhythm: Normal rate and regular rhythm. Heart sounds: Normal heart sounds. No murmur heard. Pulmonary:      Effort: Pulmonary effort is normal.      Breath sounds: Normal breath sounds. Musculoskeletal:      Cervical back: Normal range of motion and neck supple. Lymphadenopathy:      Head:      Right side of head: No submandibular adenopathy. Left side of head: No submandibular adenopathy. Cervical: No cervical adenopathy. Skin:     General: Skin is warm and dry. Findings: No rash. Neurological:      Mental Status: She is alert and oriented to person, place, and time. Cranial Nerves: No cranial nerve deficit. Psychiatric:         Behavior: Behavior normal.         Thought Content: Thought content normal.         Judgment: Judgment normal.            An electronic signature was used to authenticate this note.     --Briana Alonso MD

## 2022-11-15 NOTE — PATIENT INSTRUCTIONS
[address] Endocrine, Cholesterol and Diabetes- Alee Wan, [address] [facility], [address] [facility]  Phone: [phone]

## 2022-12-30 DIAGNOSIS — E89.0 POST-SURGICAL HYPOTHYROIDISM: ICD-10-CM

## 2022-12-30 RX ORDER — LEVOTHYROXINE SODIUM 0.12 MG/1
TABLET ORAL
Qty: 90 TABLET | Refills: 3 | OUTPATIENT
Start: 2022-12-30

## 2022-12-30 NOTE — TELEPHONE ENCOUNTER
Requested Refill:   Requested Prescriptions     Pending Prescriptions Disp Refills    levothyroxine (SYNTHROID) 125 MCG tablet [Pharmacy Med Name: LEVOTHYROXINE 125 MCG TABLET] 90 tablet 3     Sig: TAKE ONE TAB SIX DAYS A WEEK AND HALF ON SUNDAY       Last refilled: 12/7/2021 # 90 with 3 refills     Last Appt: 12/7/2021  Next Appt: follow up needed - no showed LOV

## 2023-01-19 ENCOUNTER — HOSPITAL ENCOUNTER (OUTPATIENT)
Dept: ULTRASOUND IMAGING | Age: 43
Discharge: HOME | End: 2023-01-19
Payer: COMMERCIAL

## 2023-01-19 DIAGNOSIS — E89.0 POST-SURGICAL HYPOTHYROIDISM: ICD-10-CM

## 2023-01-19 DIAGNOSIS — Z85.850 HISTORY OF THYROID CANCER: ICD-10-CM

## 2023-01-19 DIAGNOSIS — Z00.00 WELL ADULT EXAM: ICD-10-CM

## 2023-01-19 LAB
A/G RATIO: 1.8 (ref 1.1–2.2)
ALBUMIN SERPL-MCNC: 4.2 G/DL (ref 3.4–5)
ALP BLD-CCNC: 66 U/L (ref 40–129)
ALT SERPL-CCNC: 17 U/L (ref 10–40)
ANION GAP SERPL CALCULATED.3IONS-SCNC: 13 MMOL/L (ref 3–16)
AST SERPL-CCNC: 16 U/L (ref 15–37)
BILIRUB SERPL-MCNC: 0.6 MG/DL (ref 0–1)
BUN BLDV-MCNC: 10 MG/DL (ref 7–20)
CALCIUM SERPL-MCNC: 8.6 MG/DL (ref 8.3–10.6)
CHLORIDE BLD-SCNC: 104 MMOL/L (ref 99–110)
CHOLESTEROL, TOTAL: 191 MG/DL (ref 0–199)
CO2: 24 MMOL/L (ref 21–32)
CREAT SERPL-MCNC: 0.6 MG/DL (ref 0.6–1.1)
GFR SERPL CREATININE-BSD FRML MDRD: >60 ML/MIN/{1.73_M2}
GLUCOSE BLD-MCNC: 81 MG/DL (ref 70–99)
HCT VFR BLD CALC: 41.5 % (ref 36–48)
HDLC SERPL-MCNC: 52 MG/DL (ref 40–60)
HEMOGLOBIN: 13.3 G/DL (ref 12–16)
LDL CHOLESTEROL CALCULATED: 115 MG/DL
MCH RBC QN AUTO: 27.8 PG (ref 26–34)
MCHC RBC AUTO-ENTMCNC: 32.1 G/DL (ref 31–36)
MCV RBC AUTO: 86.6 FL (ref 80–100)
PDW BLD-RTO: 13.7 % (ref 12.4–15.4)
PLATELET # BLD: 285 K/UL (ref 135–450)
PMV BLD AUTO: 8.1 FL (ref 5–10.5)
POTASSIUM SERPL-SCNC: 4.1 MMOL/L (ref 3.5–5.1)
RBC # BLD: 4.79 M/UL (ref 4–5.2)
SODIUM BLD-SCNC: 141 MMOL/L (ref 136–145)
T4 FREE: 1.6 NG/DL (ref 0.9–1.8)
TOTAL PROTEIN: 6.6 G/DL (ref 6.4–8.2)
TRIGL SERPL-MCNC: 122 MG/DL (ref 0–150)
TSH SERPL DL<=0.05 MIU/L-ACNC: 0.06 UIU/ML (ref 0.27–4.2)
VLDLC SERPL CALC-MCNC: 24 MG/DL
WBC # BLD: 6.1 K/UL (ref 4–11)

## 2023-01-19 PROCEDURE — 76536 US EXAM OF HEAD AND NECK: CPT

## 2023-01-27 ENCOUNTER — TELEPHONE (OUTPATIENT)
Dept: FAMILY MEDICINE CLINIC | Age: 43
End: 2023-01-27

## 2023-01-27 NOTE — TELEPHONE ENCOUNTER
----- Message from Chelsea Tracy MD sent at 1/26/2023  5:30 PM EST -----  Cholesterol stable and kidneys normal

## 2023-02-02 ENCOUNTER — OFFICE VISIT (OUTPATIENT)
Dept: ENDOCRINOLOGY | Age: 43
End: 2023-02-02
Payer: COMMERCIAL

## 2023-02-02 VITALS
OXYGEN SATURATION: 98 % | DIASTOLIC BLOOD PRESSURE: 81 MMHG | BODY MASS INDEX: 34.55 KG/M2 | HEART RATE: 84 BPM | WEIGHT: 195 LBS | HEIGHT: 63 IN | SYSTOLIC BLOOD PRESSURE: 132 MMHG

## 2023-02-02 DIAGNOSIS — Z85.850 HISTORY OF THYROID CANCER: ICD-10-CM

## 2023-02-02 DIAGNOSIS — E89.0 POST-SURGICAL HYPOTHYROIDISM: Primary | ICD-10-CM

## 2023-02-02 DIAGNOSIS — Z92.3 HX OF RADIOACTIVE IODINE THYROID ABLATION: ICD-10-CM

## 2023-02-02 PROCEDURE — 99214 OFFICE O/P EST MOD 30 MIN: CPT | Performed by: INTERNAL MEDICINE

## 2023-02-02 RX ORDER — LEVOTHYROXINE SODIUM 0.1 MG/1
TABLET ORAL
Qty: 90 TABLET | Refills: 1 | Status: SHIPPED | OUTPATIENT
Start: 2023-02-02

## 2023-02-02 NOTE — PROGRESS NOTES
Patient ID: Roger Major is a 38 y.o. female    Chief Complaint: post surgical hypothyroidism, thyroid cancer     Here with      HPI:   Roger Major is here for an evaluation of postoperative hypothyroidism. 2014: She had total thyroidectomy in [place] (first affiliated [address])   LEE in 2014     She was seeing endocrinologist in [place]. Moved in Nov 2020. Reviewed records from [facility]. Most of them were from GYN practice. The note suggested she had a left sided 1.6 x 1 cms nodule   US neck in Dec 2015 did not show any residual thyroid mass, no abnormal lymph nodes   US neck in March 2017 did not show any residual thyroid mass, no abnormal lymph nodes   Tg < 0.1, anti Tg antibody < 1 in May 2019 when TSH was 26.42     Takes Levothyroxine 125 mcg one tab six days a week in morning empty stomach with water and waits for 30 minutes. Energy levels are okay (medium)   Weight is stable 3 lbs    She thinks she has occasional tremors  Sleep is better   Cold during the day and warm at night. It disturbed her sleep     Mild depression /anxiety - it is improving   Denies excessive sweating, tremors, palpitations    No neck pain, denies compressive neck symptoms   No menstrual cycles - she has mirena   No plans to conceive     Family history of thyroid disorder: None     No recent iodine loading in form of contrast material for diagnostic studies/cardiac cath  Takes MVT prn.  No Food supplements, herbs or medications including Biotin  No recent URTI      The following portions of the patient's history were reviewed and updated as appropriate:       Family History   Problem Relation Age of Onset    Asthma Mother     Diabetes Mother         Steroid induced    High Blood Pressure Father     No Known Problems Brother     No Known Problems Maternal Grandmother     No Known Problems Maternal Grandfather     No Known Problems Paternal Grandmother     No Known Problems Paternal Grandfather     No Known Problems Son     No Known Problems Daughter               Social History     Socioeconomic History    Marital status:      Spouse name: Not on file    Number of children: Not on file    Years of education: Not on file    Highest education level: Not on file   Occupational History    Not on file   Tobacco Use    Smoking status: Never    Smokeless tobacco: Never   Vaping Use    Vaping Use: Never used   Substance and Sexual Activity    Alcohol use: Never    Drug use: Never    Sexual activity: Yes     Partners: Male     Birth control/protection: I.U.D. Other Topics Concern    Not on file   Social History Narrative    Not on file     Social Determinants of Health     Financial Resource Strain: Low Risk     Difficulty of Paying Living Expenses: Not hard at all   Food Insecurity: No Food Insecurity    Worried About Running Out of Food in the Last Year: Never true    Ran Out of Food in the Last Year: Never true   Transportation Needs: Not on file   Physical Activity: Not on file   Stress: Not on file   Social Connections: Not on file   Intimate Partner Violence: Not on file   Housing Stability: Not on file             Past Medical History:   Diagnosis Date    Acquired hypothyroidism 2014    Thyroidectomy    History of thyroid cancer 2014    Hx of radioactive iodine thyroid ablation     IUD (intrauterine device) in place     Kidney stone          Past Surgical History:   Procedure Laterality Date    LITHOTRIPSY Left 2020    THYROIDECTOMY           No Known Allergies        Current Outpatient Medications:     levothyroxine (SYNTHROID) 100 MCG tablet, Take one tab daily, Disp: 90 tablet, Rfl: 1    ketoconazole (NIZORAL) 2 % shampoo, Apply topically to affected areas 3 times weekly, Disp: 120 mL, Rfl: 1    Misc. Devices ([employer]S PILL SPLITTER) MISC, To split levothyroxine, Disp: 1 each, Rfl: 0      Review of Systems:  Constitutional: Negative for fever, chills.    HENT: Negative for congestion, ear pain, rhinorrhea,  sore throat and trouble swallowing. Eyes: Negative for photophobia, redness, itching. Respiratory: Negative for cough, shortness of breath and sputum. Cardiovascular: Negative for chest pain and leg swelling. Gastrointestinal: Negative for nausea, vomiting, abdominal pain, diarrhea, constipation. Endocrine: Negative for polydipsia, polyphagia and polyuria. Genitourinary: Negative for dysuria, urgency, frequency, hematuria and flank pain. Musculoskeletal: Negative for myalgias, back pain, arthralgias. Skin/Nail: Negative for rash, itching. Normal nails. Neurological: Negative for seizures, light-headedness, numbness and headaches. Hematological/ Lymph nodes: Negative for adenopathy. Does not bruise/bleed easily. Psychiatric/Behavioral: Negative for suicidal ideas and decreased concentration. Physical Exam:  /81 (Site: Left Upper Arm, Position: Sitting, Cuff Size: Large Adult)   Pulse 84   Ht 5' 3\" (1.6 m)   Wt 195 lb (88.5 kg)   SpO2 98%   BMI 34.54 kg/m²       Constitutional: Patient is oriented to person, place, and time. Patient appears well-developed and well-nourished. HENT:    Head: Normocephalic and atraumatic. Eyes: Conjunctivae and EOM are normal.     Neck: Normal range of motion. Thyroid absent, scar in lower neck. Cardiovascular: Normal rate, regular rhythm and normal heart sounds. Pulmonary/Chest: Effort normal and breath sounds normal.    Musculoskeletal: Normal range of motion. Neurological: Patient is alert and oriented to person, place, and time. Patient has normal reflexes. Very mild hand tremors present. Skin: Skin is warm and dry. Psychiatric: Patient has a normal mood and affect.  Patient behavior is normal.       Lab Review:    Orders Only on 01/19/2023   Component Date Value Ref Range Status    T4 Free 01/19/2023 1.6  0.9 - 1.8 ng/dL Final    TSH 01/19/2023 0.06 (A)  0.27 - 4.20 uIU/mL Final    WBC 01/19/2023 6.1  4.0 - 11.0 K/uL Final    RBC 01/19/2023 4.79  4.00 - 5.20 M/uL Final    Hemoglobin 01/19/2023 13.3  12.0 - 16.0 g/dL Final    Hematocrit 01/19/2023 41.5  36.0 - 48.0 % Final    MCV 01/19/2023 86.6  80.0 - 100.0 fL Final    MCH 01/19/2023 27.8  26.0 - 34.0 pg Final    MCHC 01/19/2023 32.1  31.0 - 36.0 g/dL Final    RDW 01/19/2023 13.7  12.4 - 15.4 % Final    Platelets 90/56/0253 285  135 - 450 K/uL Final    MPV 01/19/2023 8.1  5.0 - 10.5 fL Final    Sodium 01/19/2023 141  136 - 145 mmol/L Final    Potassium 01/19/2023 4.1  3.5 - 5.1 mmol/L Final    Chloride 01/19/2023 104  99 - 110 mmol/L Final    CO2 01/19/2023 24  21 - 32 mmol/L Final    Anion Gap 01/19/2023 13  3 - 16 Final    Glucose 01/19/2023 81  70 - 99 mg/dL Final    BUN 01/19/2023 10  7 - 20 mg/dL Final    Creatinine 01/19/2023 0.6  0.6 - 1.1 mg/dL Final    Est, Glom Filt Rate 01/19/2023 >60  >60 Final    Calcium 01/19/2023 8.6  8.3 - 10.6 mg/dL Final    Total Protein 01/19/2023 6.6  6.4 - 8.2 g/dL Final    Albumin 01/19/2023 4.2  3.4 - 5.0 g/dL Final    Albumin/Globulin Ratio 01/19/2023 1.8  1.1 - 2.2 Final    Total Bilirubin 01/19/2023 0.6  0.0 - 1.0 mg/dL Final    Alkaline Phosphatase 01/19/2023 66  40 - 129 U/L Final    ALT 01/19/2023 17  10 - 40 U/L Final    AST 01/19/2023 16  15 - 37 U/L Final    Cholesterol, Total 01/19/2023 191  0 - 199 mg/dL Final    Triglycerides 01/19/2023 122  0 - 150 mg/dL Final    HDL 01/19/2023 52  40 - 60 mg/dL Final    LDL Calculated 01/19/2023 115 (A)  <100 mg/dL Final    VLDL Cholesterol Calculated 01/19/2023 24  Not Established mg/dL Final        No results found. Assessment/Plan:     Arlene Holman was seen today for follow-up and thyroid problem. Diagnoses and all orders for this visit:    Post-surgical hypothyroidism  -     levothyroxine (SYNTHROID) 100 MCG tablet; Take one tab daily  -     TSH; Future  -     T4, Free; Future  -     Thyroglobulin;  Future  -     Anti-Thyroglobulin Antibody; Future    Hx of radioactive iodine thyroid ablation  -     Thyroglobulin; Future  -     Anti-Thyroglobulin Antibody; Future    History of thyroid cancer        1: Thyroid cancer   Tg < 0.5, anti Tg ab 14 - Nov 2021     US neck - Oct 2021: Normal looking lymph nodes, no mass or sign of recurrence of thyroid cancer. Nov 2022: Postsurgical changes of thyroidectomy. Stable cervical lymph nodes with normal sonographic appearance. Repeat US neck and Tg in fall-winter 2022     Repeat US in Nov 2024 if Tg is undetectable     Check Tg     2: Postsurgical hypothyroidism    TSH 0.061, Free T4 1.6 - Jan 2023      Change Levothyroxine to 100 mcg , one tab daily      Education: Reviewed how to properly take thyroid replacement. Instructed to take daily with water on an empty stomach without concomitant vitamins or calcium or other medicine. Wait for 30-45 minutes before eating. If patient is confident they missed a day of pills, they can take the missed dose with their next tablet (only for levothyroxine).      RTC in 4 months with TFTs , TG, anti Tg ab     Electronically signed by Yisel Montgomery MD on 2/2/2023 at 3:12 PM

## 2023-04-19 ENCOUNTER — TELEMEDICINE (OUTPATIENT)
Dept: FAMILY MEDICINE CLINIC | Age: 43
End: 2023-04-19
Payer: COMMERCIAL

## 2023-04-19 DIAGNOSIS — R05.1 ACUTE COUGH: Primary | ICD-10-CM

## 2023-04-19 PROCEDURE — 99213 OFFICE O/P EST LOW 20 MIN: CPT | Performed by: FAMILY MEDICINE

## 2023-04-19 RX ORDER — ALBUTEROL SULFATE 90 UG/1
2 AEROSOL, METERED RESPIRATORY (INHALATION) 4 TIMES DAILY PRN
Qty: 18 G | Refills: 0 | Status: SHIPPED | OUTPATIENT
Start: 2023-04-19

## 2023-04-19 RX ORDER — BENZONATATE 200 MG/1
200 CAPSULE ORAL 3 TIMES DAILY PRN
Qty: 30 CAPSULE | Refills: 1 | Status: SHIPPED | OUTPATIENT
Start: 2023-04-19

## 2023-04-19 RX ORDER — FLUTICASONE FUROATE, UMECLIDINIUM BROMIDE AND VILANTEROL TRIFENATATE 200; 62.5; 25 UG/1; UG/1; UG/1
POWDER RESPIRATORY (INHALATION)
Qty: 1 EACH | Refills: 0 | Status: CANCELLED | COMMUNITY
Start: 2023-04-19

## 2023-04-19 NOTE — PROGRESS NOTES
Leana Wyman (:  1980) is a Established patient, presenting virtually for evaluation of the following:    Assessment & Plan   Below is the assessment and plan developed based on review of pertinent history, physical exam, labs, studies, and medications. 1. Acute cough  Reviewed diagnosis of bronchitis and differential diagnosis, including post infectious cough. Prescription medications for symptom relief reviewed, as well as their possible side effects and adverse effects. Supportive care including rest and otc treatments (honey, lemon, tea, humidified air) reviewed. Call if worsening cough or chest pain, fever, chills, or myalgias develop. No follow-ups on file. Subjective   HPI  Pt is a of 37 y.o. female comes in today with   Chief Complaint   Patient presents with    Hoarse    Congestion    Cough     Symptoms started Friday, progressively getting worse. Started with cough on Friday. Getting worse. Taking theraflu  Having some shortness of breath    Review of Systems       Objective   Patient-Reported Vitals  BP Observations: No, remote/electronic monitoring device was not used or able to be verified  Patient-Reported Temperature: \"I DONT HAVE TEMPERATURE\"  Patient-Reported Weight: 188LB  Patient-Reported Height: 5' 3\"       Physical Exam             Leana Wyman, was evaluated through a synchronous (real-time) audio-video encounter. The patient (or guardian if applicable) is aware that this is a billable service, which includes applicable co-pays. This Virtual Visit was conducted with patient's (and/or legal guardian's) consent. The visit was conducted pursuant to the emergency declaration under the [address] [place] [address] authority and the [employer] General Act. Patient identification was verified, and a caregiver was present when appropriate.    The patient was located at Home: [place] [id number]

## 2023-06-07 LAB
ANTI-THYROGLOB ABS: 14 IU/ML
TSH SERPL DL<=0.005 MIU/L-ACNC: 6.68 UIU/ML (ref 0.27–4.2)

## 2023-06-14 DIAGNOSIS — Z92.3 HX OF RADIOACTIVE IODINE THYROID ABLATION: ICD-10-CM

## 2023-06-14 DIAGNOSIS — E89.0 POST-SURGICAL HYPOTHYROIDISM: ICD-10-CM

## 2023-06-19 LAB — THYROGLOB SERPL-MCNC: <0.5 NG/ML (ref 1.3–31.8)

## 2023-08-06 DIAGNOSIS — E89.0 POST-SURGICAL HYPOTHYROIDISM: ICD-10-CM

## 2023-08-07 RX ORDER — LEVOTHYROXINE SODIUM 0.1 MG/1
TABLET ORAL
Qty: 90 TABLET | Refills: 1 | OUTPATIENT
Start: 2023-08-07

## 2023-08-07 NOTE — TELEPHONE ENCOUNTER
Requested Refill:   Requested Prescriptions     Pending Prescriptions Disp Refills    levothyroxine (SYNTHROID) 100 MCG tablet [Pharmacy Med Name: LEVOTHYROXINE 100 MCG TABLET] 90 tablet 1     Sig: TAKE 1 TABLET BY MOUTH EVERY DAY     Last refilled: 6/14/2023 # 90 with 1 refill for 112 mcg daily #90 with 1 refill.

## 2023-11-13 DIAGNOSIS — Z85.850 HISTORY OF THYROID CANCER: ICD-10-CM

## 2023-11-13 DIAGNOSIS — Z92.3 HX OF RADIOACTIVE IODINE THYROID ABLATION: ICD-10-CM

## 2023-11-13 DIAGNOSIS — E89.0 POST-SURGICAL HYPOTHYROIDISM: ICD-10-CM

## 2023-11-13 LAB
ANTI-THYROGLOB ABS: 15 IU/ML
T4 FREE SERPL-MCNC: 1.5 NG/DL (ref 0.9–1.8)
TSH SERPL DL<=0.005 MIU/L-ACNC: 1.7 UIU/ML (ref 0.27–4.2)

## 2023-11-14 ENCOUNTER — OFFICE VISIT (OUTPATIENT)
Dept: ENDOCRINOLOGY | Age: 43
End: 2023-11-14
Payer: COMMERCIAL

## 2023-11-14 VITALS
OXYGEN SATURATION: 98 % | HEART RATE: 71 BPM | WEIGHT: 202.6 LBS | SYSTOLIC BLOOD PRESSURE: 126 MMHG | HEIGHT: 63 IN | DIASTOLIC BLOOD PRESSURE: 79 MMHG | BODY MASS INDEX: 35.9 KG/M2

## 2023-11-14 DIAGNOSIS — Z92.3 HX OF RADIOACTIVE IODINE THYROID ABLATION: ICD-10-CM

## 2023-11-14 DIAGNOSIS — Z85.850 HISTORY OF THYROID CANCER: ICD-10-CM

## 2023-11-14 DIAGNOSIS — E89.0 POST-SURGICAL HYPOTHYROIDISM: Primary | ICD-10-CM

## 2023-11-14 PROCEDURE — 99214 OFFICE O/P EST MOD 30 MIN: CPT | Performed by: INTERNAL MEDICINE

## 2023-11-14 RX ORDER — LEVOTHYROXINE SODIUM 112 UG/1
TABLET ORAL
Qty: 90 TABLET | Refills: 2 | Status: SHIPPED | OUTPATIENT
Start: 2023-11-14

## 2023-11-15 ENCOUNTER — OFFICE VISIT (OUTPATIENT)
Dept: GYNECOLOGY | Age: 43
End: 2023-11-15
Payer: COMMERCIAL

## 2023-11-15 VITALS
BODY MASS INDEX: 35.78 KG/M2 | SYSTOLIC BLOOD PRESSURE: 124 MMHG | DIASTOLIC BLOOD PRESSURE: 82 MMHG | OXYGEN SATURATION: 97 % | HEART RATE: 72 BPM | WEIGHT: 202 LBS

## 2023-11-15 DIAGNOSIS — Z97.5 PRESENCE OF INTRAUTERINE CONTRACEPTIVE DEVICE (IUD): ICD-10-CM

## 2023-11-15 DIAGNOSIS — Z12.31 BREAST CANCER SCREENING BY MAMMOGRAM: ICD-10-CM

## 2023-11-15 DIAGNOSIS — Z01.419 WELL WOMAN EXAM WITH ROUTINE GYNECOLOGICAL EXAM: Primary | ICD-10-CM

## 2023-11-15 PROCEDURE — 58301 REMOVE INTRAUTERINE DEVICE: CPT | Performed by: OBSTETRICS & GYNECOLOGY

## 2023-11-15 PROCEDURE — 99386 PREV VISIT NEW AGE 40-64: CPT | Performed by: OBSTETRICS & GYNECOLOGY

## 2023-11-15 RX ORDER — LEVONORGESTREL 52 MG/1
1 INTRAUTERINE DEVICE INTRAUTERINE ONCE
COMMUNITY
Start: 2017-01-01

## 2023-11-15 NOTE — PROGRESS NOTES
Catia Villegas is a 37 y.o. female here today for intrauterine device removal.    She has been previously counseled. Warnings and instructions were given including risks of removal.    She is able to voice understanding, consents to proceed with IUD removal.      PROCEDURE:    IUD removal:  With the patient comfortably in lithotomy position, a bimanual exam was performed to establish the size, shape and position of the uterus. A speculum was gently inserted into the vagina to visualize the cervix. Mirena was removed by applying gentle traction on the threads with forceps. The device was withdrawn without complication, shown to the patient, and disposed of. No complications were experienced and the patient tolerated the procedure well.
The sensitive parts of the examination were performed with Thierry Palmer MA as a chaperone. Philomena Medley was present during the entirety of the sensitive parts of the examination.
lymphadenopathy  Genitourinary:  Vulva:  no external lesions,normal hair distribution,no inguinal adenopathy  Vagina:  moist,pink,well rugated,no discharge  Bladder without mass, nontender. Urethra, and Urethral meatus grossly normal without mass and nontender  Cervix:  smooth,pink,no lesions. Short IUD strings  Uterus:  normal size, shape and consistency,mobile,nontender  Adnexa:  no masses,no tenderness  Rectum/anus:  no external hemorrhoids,no lesions  History and Examination chaperoned by Medical Assistant    ASSESSMENT AND PLAN:   Diagnosis Orders   1. Well woman exam with routine gynecological exam  PAP SMEAR      2. Presence of intrauterine contraceptive device (IUD)  85727 - CA REMOVE INTRAUTERINE DEVICE      3. Breast cancer screening by mammogram  NICK DIGITAL SCREEN W OR WO CAD BILATERAL        She reports having her Mirena IUD for about 6 years. She wants it removed. We discussed the contraceptive benefit for about 8 years. Again, she wants it removed. She has been amenorrheic with it. We discussed resumption of menstruation. She declines alternative forms of family-planning.     SBE monthly and return annually or prn  Explained current pap smear guidelines  Patient counseling:  SBE demonstrated

## 2023-11-17 LAB — THYROGLOB SERPL-MCNC: <0.5 NG/ML (ref 1.3–31.8)

## 2023-11-18 LAB
HPV HR 12 DNA SPEC QL NAA+PROBE: NOT DETECTED
HPV16 DNA SPEC QL NAA+PROBE: NOT DETECTED
HPV16+18+H RISK 12 DNA SPEC-IMP: NORMAL
HPV18 DNA SPEC QL NAA+PROBE: NOT DETECTED

## 2024-01-08 ENCOUNTER — TELEPHONE (OUTPATIENT)
Dept: FAMILY MEDICINE CLINIC | Age: 44
End: 2024-01-08

## 2024-01-08 ENCOUNTER — HOSPITAL ENCOUNTER (OUTPATIENT)
Dept: PHYSICAL THERAPY | Age: 44
Setting detail: THERAPIES SERIES
Discharge: HOME | End: 2024-01-08
Payer: COMMERCIAL

## 2024-01-08 DIAGNOSIS — M54.42 ACUTE LEFT-SIDED LOW BACK PAIN WITH LEFT-SIDED SCIATICA: Primary | ICD-10-CM

## 2024-01-08 DIAGNOSIS — R05.3 CHRONIC COUGH: Primary | ICD-10-CM

## 2024-01-08 PROCEDURE — 97110 THERAPEUTIC EXERCISES: CPT

## 2024-01-08 PROCEDURE — 97161 PT EVAL LOW COMPLEX 20 MIN: CPT

## 2024-01-08 NOTE — FLOWSHEET NOTE
criteria necessary for medical necessity for care and/or justification of therapy services:  {MEDICAL NECESSITY DOCUMENTATION:65581}    Treatment/Activity Tolerance:  [x] Patient tolerated treatment well [] Patient limited by fatique  [] Patient limited by pain  [] Patient limited by other medical complications  [] Other:     Return to Play: {Return to play:81219::\"NA\"}    Prognosis for POC: [x] Good [] Fair  [] Poor    Patient requires continued skilled intervention: [x] Yes  [] No      GOALS     *** (cut and paste from eval)    Overall Progression Towards Functional goals/ Treatment Progress Update:  [] Patient is progressing as expected towards functional goals listed.    [] Progression is slowed due to complexities/Impairments listed.  [] Progression has been slowed due to co-morbidities.  [x] Plan just implemented, too soon (<30days) to assess goals progression   [] Goals require adjustment due to lack of progress  [] Patient is not progressing as expected and requires additional follow up with physician  [] Other:     CHARGE CAPTURE     {ChargeCapture:28135}    Charge Justification:  {charge justification:48175}    TREATMENT PLAN   Plan: {PLAN:82143::\"POC Initiated today- see eval for details\"}    Electronically Signed by Kendra Schumacher, PT              Date: 01/08/2024     Note: If patient does not return for scheduled/recommended follow up visits, this note will serve as a discharge from care along with the most recent update on progress.

## 2024-01-08 NOTE — TELEPHONE ENCOUNTER
Patient came into the office stating that she has been having urinary frequency and urgency. She is also having lower abdominal pain while she is urinating. She has a hx of kidney stones and is requesting medication be sent in. The patient denied wanting to schedule an appointment. She just requested I send a message back to the provider. Please advise.

## 2024-01-08 NOTE — PLAN OF CARE
[facility] [address], [place] [address] office: [phone] fax: [phone]     Physical Therapy Certification      Dear Scott Guidry MD,    We had the pleasure of evaluating the following patient for physical therapy services at [facility].  A summary of our findings can be found in the initial assessment below.  This includes our plan of care.  If you have any questions or concerns regarding these findings, please do not hesitate to contact me at the office phone number listed above.  Thank you for the referral.     Physician Signature:_______________________________Date:__________________  By signing above (or electronic signature), therapist’s plan is approved by physician       Initial Evaluation   Patient: Jenelle Srinivasan (43 y.o. female)   Examination Date: 2024   :  1980 MRN: [id number]   Visit #: 1    Insurance: Payor: CIGNA / Plan: CIGNA / Product Type: *No Product type* /   Insurance ID: [id number] - (Commercial)  Secondary Insurance (if applicable):    Treatment Diagnosis:     ICD-10-CM    1. Acute left-sided low back pain with left-sided sciatica  M54.42          Medical Diagnosis:  Left sided sciatica [M54.32]   Referring Physician: Scott Guidry MD  PCP: Scott Guidry MD                              Precautions/ Contra-indications:           Latex allergy:  NO  Pacemaker:    NO  Contraindications for Manipulation: None  Date of Surgery: n/a  Other:     Red Flags:  None    C-SSRS Triggered by Intake questionnaire:   [x] No, Questionnaire did not trigger screening.   [] Yes, Patient intake triggered further evaluation      [] C-SSRS Screening completed  [] PCP notified via Plan of Care  [] Emergency services notified     Preferred Language for Healthcare:   [x] English       [] other:    SUBJECTIVE EXAMINATION     Patient stated complaint: Pt reports onset of L

## 2024-01-08 NOTE — TELEPHONE ENCOUNTER
Patient came into the office and was discussed her results from her chest x-ray. She would like to go forward with having a PFT completed. Please call patient once the order has been placed.

## 2024-01-09 ENCOUNTER — OFFICE VISIT (OUTPATIENT)
Dept: FAMILY MEDICINE CLINIC | Age: 44
End: 2024-01-09
Payer: COMMERCIAL

## 2024-01-09 VITALS
SYSTOLIC BLOOD PRESSURE: 122 MMHG | WEIGHT: 201 LBS | BODY MASS INDEX: 35.61 KG/M2 | DIASTOLIC BLOOD PRESSURE: 64 MMHG | OXYGEN SATURATION: 99 % | HEART RATE: 87 BPM

## 2024-01-09 DIAGNOSIS — R35.0 URINE FREQUENCY: Primary | ICD-10-CM

## 2024-01-09 LAB
BACTERIA URNS QL MICRO: ABNORMAL /HPF
BILIRUB UR QL STRIP.AUTO: NEGATIVE
BILIRUBIN, POC: ABNORMAL
BLOOD URINE, POC: ABNORMAL
CALCIUM OXALATE CRYSTALS: PRESENT
CLARITY UR: ABNORMAL
CLARITY, POC: CLEAR
COLOR UR: YELLOW
COLOR, POC: ABNORMAL
EPI CELLS #/AREA URNS AUTO: 8 /HPF (ref 0–5)
GLUCOSE UR STRIP.AUTO-MCNC: NEGATIVE MG/DL
GLUCOSE URINE, POC: ABNORMAL
HGB UR QL STRIP.AUTO: ABNORMAL
HYALINE CASTS #/AREA URNS AUTO: 0 /LPF (ref 0–8)
KETONES UR STRIP.AUTO-MCNC: NEGATIVE MG/DL
KETONES, POC: ABNORMAL
LEUKOCYTE EST, POC: ABNORMAL
LEUKOCYTE ESTERASE UR QL STRIP.AUTO: ABNORMAL
NITRITE UR QL STRIP.AUTO: NEGATIVE
NITRITE, POC: ABNORMAL
PH UR STRIP.AUTO: 5.5 [PH] (ref 5–8)
PH, POC: 5.5
PROT UR STRIP.AUTO-MCNC: ABNORMAL MG/DL
PROTEIN, POC: ABNORMAL
RBC CLUMPS #/AREA URNS AUTO: 6 /HPF (ref 0–4)
SP GR UR STRIP.AUTO: 1.03 (ref 1–1.03)
SPECIFIC GRAVITY, POC: >1.03
UA DIPSTICK W REFLEX MICRO PNL UR: YES
URN SPEC COLLECT METH UR: ABNORMAL
UROBILINOGEN UR STRIP-ACNC: 0.2 E.U./DL
UROBILINOGEN, POC: 0.2
WBC #/AREA URNS AUTO: 18 /HPF (ref 0–5)

## 2024-01-09 PROCEDURE — 99214 OFFICE O/P EST MOD 30 MIN: CPT | Performed by: NURSE PRACTITIONER

## 2024-01-09 PROCEDURE — 81003 URINALYSIS AUTO W/O SCOPE: CPT | Performed by: NURSE PRACTITIONER

## 2024-01-09 RX ORDER — NITROFURANTOIN 25; 75 MG/1; MG/1
100 CAPSULE ORAL 2 TIMES DAILY
Qty: 20 CAPSULE | Refills: 0 | Status: SHIPPED | OUTPATIENT
Start: 2024-01-09 | End: 2024-01-19

## 2024-01-09 SDOH — ECONOMIC STABILITY: INCOME INSECURITY: HOW HARD IS IT FOR YOU TO PAY FOR THE VERY BASICS LIKE FOOD, HOUSING, MEDICAL CARE, AND HEATING?: NOT VERY HARD

## 2024-01-09 SDOH — ECONOMIC STABILITY: INCOME INSECURITY: HOW HARD IS IT FOR YOU TO PAY FOR THE VERY BASICS LIKE FOOD, HOUSING, MEDICAL CARE, AND HEATING?: NOT HARD AT ALL

## 2024-01-09 SDOH — ECONOMIC STABILITY: FOOD INSECURITY: WITHIN THE PAST 12 MONTHS, YOU WORRIED THAT YOUR FOOD WOULD RUN OUT BEFORE YOU GOT MONEY TO BUY MORE.: NEVER TRUE

## 2024-01-09 SDOH — ECONOMIC STABILITY: FOOD INSECURITY: WITHIN THE PAST 12 MONTHS, THE FOOD YOU BOUGHT JUST DIDN'T LAST AND YOU DIDN'T HAVE MONEY TO GET MORE.: NEVER TRUE

## 2024-01-09 SDOH — ECONOMIC STABILITY: HOUSING INSECURITY
IN THE LAST 12 MONTHS, WAS THERE A TIME WHEN YOU DID NOT HAVE A STEADY PLACE TO SLEEP OR SLEPT IN A SHELTER (INCLUDING NOW)?: NO

## 2024-01-09 SDOH — ECONOMIC STABILITY: TRANSPORTATION INSECURITY
IN THE PAST 12 MONTHS, HAS LACK OF TRANSPORTATION KEPT YOU FROM MEETINGS, WORK, OR FROM GETTING THINGS NEEDED FOR DAILY LIVING?: NO

## 2024-01-09 ASSESSMENT — ENCOUNTER SYMPTOMS
TROUBLE SWALLOWING: 0
ABDOMINAL PAIN: 0
SINUS PRESSURE: 0
STRIDOR: 0
BLOOD IN STOOL: 0
COUGH: 0
CONSTIPATION: 0
SHORTNESS OF BREATH: 0
PHOTOPHOBIA: 0
CHOKING: 0
COLOR CHANGE: 0
SINUS PAIN: 0
NAUSEA: 0
DIARRHEA: 0
VOMITING: 0
SORE THROAT: 0
CHEST TIGHTNESS: 0
EYE ITCHING: 0
EYE DISCHARGE: 0
VOICE CHANGE: 0
EYE REDNESS: 0
WHEEZING: 0
BACK PAIN: 0
EYE PAIN: 0
RHINORRHEA: 0

## 2024-01-09 ASSESSMENT — PATIENT HEALTH QUESTIONNAIRE - PHQ9
SUM OF ALL RESPONSES TO PHQ QUESTIONS 1-9: 2
1. LITTLE INTEREST OR PLEASURE IN DOING THINGS: SEVERAL DAYS
2. FEELING DOWN, DEPRESSED OR HOPELESS: 1
2. FEELING DOWN, DEPRESSED OR HOPELESS: SEVERAL DAYS
SUM OF ALL RESPONSES TO PHQ QUESTIONS 1-9: 2
SUM OF ALL RESPONSES TO PHQ QUESTIONS 1-9: 2
1. LITTLE INTEREST OR PLEASURE IN DOING THINGS: 1
SUM OF ALL RESPONSES TO PHQ9 QUESTIONS 1 & 2: 2
SUM OF ALL RESPONSES TO PHQ9 QUESTIONS 1 & 2: 2
SUM OF ALL RESPONSES TO PHQ QUESTIONS 1-9: 2

## 2024-01-09 NOTE — PROGRESS NOTES
Chief Complaint   Patient presents with    Urinary Urgency     Urine urgency, frequent urination , leaking       /64   Pulse 87   Wt 91.2 kg (201 lb)   SpO2 99%   BMI 35.61 kg/m²     HPI:  Jenelle Srinivasan is a 43 y.o. (: 1980) here today   for   HPI    Patient's medications, allergies, past medical, surgical, social and family histories were reviewed and updated as appropriate.    Urinary freq: started last Friday, getting worse, pressure and pain lower abdomen. Denies sharp pain, has had kidney stones prior but does not feel like this. No burning, pt having urgency. Hard to hold urine in. Denies saddle numbness. Feels like she is not emptying.     Results for orders placed or performed in visit on 24   POCT Urinalysis No Micro (Auto)   Result Value Ref Range    Color, UA light yellow     Clarity, UA clear     Glucose, UA POC neg     Bilirubin, UA neg     Ketones, UA neg     Spec Grav, UA >1.030     Blood, UA POC small     pH, UA 5.5     Protein, UA POC neg     Urobilinogen, UA 0.2     Leukocytes, UA trace     Nitrite, UA neg      ROS:  Review of Systems   Constitutional:  Negative for activity change, appetite change, chills, diaphoresis, fatigue, fever and unexpected weight change.   HENT:  Negative for congestion, ear discharge, ear pain, hearing loss, nosebleeds, postnasal drip, rhinorrhea, sinus pressure, sinus pain, sneezing, sore throat, tinnitus, trouble swallowing and voice change.    Eyes:  Negative for photophobia, pain, discharge, redness and itching.   Respiratory:  Negative for cough, choking, chest tightness, shortness of breath, wheezing and stridor.    Cardiovascular:  Negative for chest pain, palpitations and leg swelling.   Gastrointestinal:  Negative for abdominal pain, blood in stool, constipation, diarrhea, nausea and vomiting.   Endocrine: Negative for cold intolerance, heat intolerance, polydipsia and polyuria.   Genitourinary:  Positive for frequency and urgency.

## 2024-01-10 ENCOUNTER — TELEPHONE (OUTPATIENT)
Dept: FAMILY MEDICINE CLINIC | Age: 44
End: 2024-01-10

## 2024-01-10 NOTE — TELEPHONE ENCOUNTER
I called the patient regarding her urine results. She was informed and is going to call the urologist. She wanted to know if she should continue taking the medication that was prescribed for her.

## 2024-01-11 LAB
BACTERIA UR CULT: ABNORMAL
ORGANISM: ABNORMAL

## 2024-01-16 RX ORDER — PANTOPRAZOLE SODIUM 40 MG/1
40 TABLET, DELAYED RELEASE ORAL
Qty: 90 TABLET | Refills: 0 | Status: SHIPPED | OUTPATIENT
Start: 2024-01-16

## 2024-01-16 NOTE — TELEPHONE ENCOUNTER
Medication:   Requested Prescriptions     Pending Prescriptions Disp Refills    pantoprazole (PROTONIX) 40 MG tablet 90 tablet 0     Sig: Take 1 tablet by mouth every morning (before breakfast)        Last Filled:  12/18/23    Patient Phone Number: [phone] (home)     Last appt: 1/9/2024   Next appt: Visit date not found    Last OARRS:        No data to display

## 2024-01-17 ENCOUNTER — APPOINTMENT (OUTPATIENT)
Dept: PHYSICAL THERAPY | Age: 44
End: 2024-01-17
Payer: COMMERCIAL

## 2024-01-18 ENCOUNTER — HOSPITAL ENCOUNTER (OUTPATIENT)
Dept: WOMENS IMAGING | Age: 44
Discharge: HOME | End: 2024-01-18
Attending: OBSTETRICS & GYNECOLOGY
Payer: COMMERCIAL

## 2024-01-18 ENCOUNTER — HOSPITAL ENCOUNTER (OUTPATIENT)
Dept: GENERAL RADIOLOGY | Age: 44
Discharge: HOME | End: 2024-01-18
Attending: OBSTETRICS & GYNECOLOGY
Payer: COMMERCIAL

## 2024-01-18 VITALS — HEIGHT: 63 IN | BODY MASS INDEX: 35.44 KG/M2 | WEIGHT: 200 LBS

## 2024-01-18 DIAGNOSIS — Z12.31 BREAST CANCER SCREENING BY MAMMOGRAM: ICD-10-CM

## 2024-01-18 DIAGNOSIS — N20.0 KIDNEY STONES: ICD-10-CM

## 2024-01-18 PROCEDURE — 74018 RADEX ABDOMEN 1 VIEW: CPT

## 2024-01-18 PROCEDURE — 77063 BREAST TOMOSYNTHESIS BI: CPT

## 2024-01-24 ENCOUNTER — APPOINTMENT (OUTPATIENT)
Dept: PHYSICAL THERAPY | Age: 44
End: 2024-01-24
Payer: COMMERCIAL

## 2024-01-25 ENCOUNTER — HOSPITAL ENCOUNTER (OUTPATIENT)
Dept: ULTRASOUND IMAGING | Age: 44
Discharge: HOME | End: 2024-01-25
Attending: OBSTETRICS & GYNECOLOGY
Payer: COMMERCIAL

## 2024-01-25 DIAGNOSIS — R92.8 ABNORMAL MAMMOGRAM: ICD-10-CM

## 2024-01-25 DIAGNOSIS — R92.8 ABNORMAL MAMMOGRAM OF LEFT BREAST: ICD-10-CM

## 2024-01-25 PROCEDURE — 76642 ULTRASOUND BREAST LIMITED: CPT

## 2024-04-03 ENCOUNTER — OFFICE VISIT (OUTPATIENT)
Dept: FAMILY MEDICINE CLINIC | Age: 44
End: 2024-04-03
Payer: COMMERCIAL

## 2024-04-03 VITALS
HEIGHT: 63 IN | OXYGEN SATURATION: 98 % | DIASTOLIC BLOOD PRESSURE: 58 MMHG | HEART RATE: 88 BPM | BODY MASS INDEX: 36.5 KG/M2 | SYSTOLIC BLOOD PRESSURE: 122 MMHG | WEIGHT: 206 LBS

## 2024-04-03 DIAGNOSIS — R53.83 OTHER FATIGUE: ICD-10-CM

## 2024-04-03 DIAGNOSIS — R53.83 EXHAUSTION: ICD-10-CM

## 2024-04-03 DIAGNOSIS — R35.0 URINE FREQUENCY: Primary | ICD-10-CM

## 2024-04-03 LAB
ALBUMIN SERPL-MCNC: 4 G/DL (ref 3.4–5)
ALBUMIN/GLOB SERPL: 1.7 {RATIO} (ref 1.1–2.2)
ALP SERPL-CCNC: 76 U/L (ref 40–129)
ALT SERPL-CCNC: 22 U/L (ref 10–40)
ANION GAP SERPL CALCULATED.3IONS-SCNC: 11 MMOL/L (ref 3–16)
AST SERPL-CCNC: 20 U/L (ref 15–37)
BASOPHILS # BLD: 0.1 K/UL (ref 0–0.2)
BASOPHILS NFR BLD: 0.5 %
BILIRUB SERPL-MCNC: 0.6 MG/DL (ref 0–1)
BILIRUBIN, POC: NORMAL
BLOOD URINE, POC: NORMAL
BUN SERPL-MCNC: 8 MG/DL (ref 7–20)
CALCIUM SERPL-MCNC: 8.7 MG/DL (ref 8.3–10.6)
CHLORIDE SERPL-SCNC: 104 MMOL/L (ref 99–110)
CLARITY, POC: NORMAL
CO2 SERPL-SCNC: 23 MMOL/L (ref 21–32)
COLOR, POC: NORMAL
CREAT SERPL-MCNC: 0.7 MG/DL (ref 0.6–1.1)
DEPRECATED RDW RBC AUTO: 13.7 % (ref 12.4–15.4)
EOSINOPHIL # BLD: 0.3 K/UL (ref 0–0.6)
EOSINOPHIL NFR BLD: 2.7 %
GFR SERPLBLD CREATININE-BSD FMLA CKD-EPI: >90 ML/MIN/{1.73_M2}
GLUCOSE SERPL-MCNC: 80 MG/DL (ref 70–99)
GLUCOSE URINE, POC: NORMAL
HCT VFR BLD AUTO: 36.9 % (ref 36–48)
HGB BLD-MCNC: 12.5 G/DL (ref 12–16)
KETONES, POC: NORMAL
LEUKOCYTE EST, POC: NORMAL
LYMPHOCYTES # BLD: 2 K/UL (ref 1–5.1)
LYMPHOCYTES NFR BLD: 17.4 %
MCH RBC QN AUTO: 27.8 PG (ref 26–34)
MCHC RBC AUTO-ENTMCNC: 33.8 G/DL (ref 31–36)
MCV RBC AUTO: 82.3 FL (ref 80–100)
MONOCYTES # BLD: 0.9 K/UL (ref 0–1.3)
MONOCYTES NFR BLD: 7.4 %
NEUTROPHILS # BLD: 8.4 K/UL (ref 1.7–7.7)
NEUTROPHILS NFR BLD: 72 %
NITRITE, POC: NORMAL
PH, POC: 5.5
PLATELET # BLD AUTO: 305 K/UL (ref 135–450)
PMV BLD AUTO: 7.9 FL (ref 5–10.5)
POTASSIUM SERPL-SCNC: 4.1 MMOL/L (ref 3.5–5.1)
PROT SERPL-MCNC: 6.3 G/DL (ref 6.4–8.2)
PROTEIN, POC: NORMAL
RBC # BLD AUTO: 4.49 M/UL (ref 4–5.2)
SODIUM SERPL-SCNC: 138 MMOL/L (ref 136–145)
SPECIFIC GRAVITY, POC: 1.01
T4 FREE SERPL-MCNC: 1.2 NG/DL (ref 0.9–1.8)
TSH SERPL DL<=0.005 MIU/L-ACNC: 6.26 UIU/ML (ref 0.27–4.2)
UROBILINOGEN, POC: 0.2
WBC # BLD AUTO: 11.7 K/UL (ref 4–11)

## 2024-04-03 PROCEDURE — 99213 OFFICE O/P EST LOW 20 MIN: CPT | Performed by: NURSE PRACTITIONER

## 2024-04-03 PROCEDURE — 81003 URINALYSIS AUTO W/O SCOPE: CPT | Performed by: NURSE PRACTITIONER

## 2024-04-03 ASSESSMENT — ENCOUNTER SYMPTOMS
EYE PAIN: 0
ABDOMINAL PAIN: 0
BLOOD IN STOOL: 0
PHOTOPHOBIA: 0
EYE DISCHARGE: 0
DIARRHEA: 0
COLOR CHANGE: 0
VOMITING: 0
NAUSEA: 0
CHOKING: 0
VOICE CHANGE: 0
SINUS PAIN: 0
RHINORRHEA: 0
BACK PAIN: 0
WHEEZING: 0
EYE ITCHING: 0
COUGH: 0
SORE THROAT: 0
SINUS PRESSURE: 0
SHORTNESS OF BREATH: 0
TROUBLE SWALLOWING: 0

## 2024-04-03 NOTE — PROGRESS NOTES
Chief Complaint   Patient presents with    Other     Can't hold pee       BP (!) 122/58   Pulse 88   Ht 1.6 m (5' 3\")   Wt 93.4 kg (206 lb)   SpO2 98%   BMI 36.49 kg/m²     HPI:  Jenelle Srinivasan is a 44 y.o. (: 1980) here today   for   HPI    Patient's medications, allergies, past medical, surgical, social and family histories were reviewed and updated as appropriate.    Urinary freq: started yesterday. Little bit of urine leakage, last time she had this she had constipation. She has urgency and could barely get there. Lower abdomen cramping and painful, feeling fatigued. Period end of last week, started spotting yesterday suddenly. She has gyn. She will follow up with Dr. Temple.  She saw Dr. King in past. Pt states she ruled out kidney stone. Last occurences was January , 10 day supply given she stopped med on day 5. Advised not to do this. She was trying to find abx she had at house, she took amoxicillin last night and this morning she found in house.       Results for orders placed or performed in visit on 24   POCT Urinalysis No Micro (Auto)   Result Value Ref Range    Color, UA      Clarity, UA      Glucose, UA POC neg     Bilirubin, UA neg     Ketones, UA neg     Spec Grav, UA 1.010     Blood, UA POC trace-intact     pH, UA 5.5     Protein, UA POC neg     Urobilinogen, UA 0.2     Leukocytes, UA trace     Nitrite, UA neg      ROS:  Review of Systems   Constitutional:  Positive for fatigue. Negative for activity change, appetite change, chills, diaphoresis, fever and unexpected weight change.   HENT:  Negative for congestion, ear discharge, ear pain, hearing loss, nosebleeds, postnasal drip, rhinorrhea, sinus pressure, sinus pain, sneezing, sore throat, tinnitus, trouble swallowing and voice change.    Eyes:  Negative for photophobia, pain, discharge, redness and itching.   Respiratory:  Negative for cough, choking, chest tightness, shortness of breath, wheezing and stridor.

## 2024-04-04 LAB — BACTERIA UR CULT: NORMAL

## 2024-06-17 DIAGNOSIS — Z92.3 HX OF RADIOACTIVE IODINE THYROID ABLATION: ICD-10-CM

## 2024-06-17 DIAGNOSIS — Z85.850 HISTORY OF THYROID CANCER: ICD-10-CM

## 2024-06-17 DIAGNOSIS — E89.0 POST-SURGICAL HYPOTHYROIDISM: ICD-10-CM

## 2024-06-17 LAB
ANTI-THYROGLOB ABS: 14 IU/ML
T3FREE SERPL-MCNC: 2.7 PG/ML (ref 2.3–4.2)
T4 FREE SERPL-MCNC: 1.7 NG/DL (ref 0.9–1.8)
TSH SERPL DL<=0.005 MIU/L-ACNC: 0.12 UIU/ML (ref 0.27–4.2)

## 2024-06-18 ENCOUNTER — OFFICE VISIT (OUTPATIENT)
Dept: ENDOCRINOLOGY | Age: 44
End: 2024-06-18
Payer: COMMERCIAL

## 2024-06-18 VITALS
HEIGHT: 63 IN | RESPIRATION RATE: 14 BRPM | OXYGEN SATURATION: 98 % | BODY MASS INDEX: 34.38 KG/M2 | WEIGHT: 194 LBS | DIASTOLIC BLOOD PRESSURE: 85 MMHG | TEMPERATURE: 98 F | HEART RATE: 71 BPM | SYSTOLIC BLOOD PRESSURE: 124 MMHG

## 2024-06-18 DIAGNOSIS — Z92.3 HX OF RADIOACTIVE IODINE THYROID ABLATION: ICD-10-CM

## 2024-06-18 DIAGNOSIS — E89.0 POST-SURGICAL HYPOTHYROIDISM: ICD-10-CM

## 2024-06-18 DIAGNOSIS — Z85.850 HISTORY OF THYROID CANCER: ICD-10-CM

## 2024-06-18 PROCEDURE — 99214 OFFICE O/P EST MOD 30 MIN: CPT | Performed by: INTERNAL MEDICINE

## 2024-06-18 RX ORDER — LEVOTHYROXINE SODIUM 112 UG/1
TABLET ORAL
Qty: 90 TABLET | Refills: 2 | Status: SHIPPED | OUTPATIENT
Start: 2024-06-18

## 2024-06-18 NOTE — PROGRESS NOTES
Patient ID: Jenelle Srinivasan is a 44 y.o. 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     2014: She had total thyroidectomy in [place] ([facility])   LEE in 2014     She was seeing endocrinologist in [place]. Moved in Nov 2020.   Reviewed records from [facility]. Most of them were from GYN practice.    The note suggested she had a left sided 1.6 x 1 cms nodule   US neck in Dec 2015 did not show any residual thyroid mass, no abnormal lymph nodes   US neck in March 2017 did not show any residual thyroid mass, no abnormal lymph nodes   Tg < 0.1, anti Tg antibody < 1 in May 2019 when TSH was 26.42     Takes Levothyroxine 112 mcg one tab daily in morning empty stomach with water and waits for > 30 minutes.     Energy levels are lower than normal.    Weight is down 8 lbs. Dong intermittent fasting     Tremors of right hand   Not sleeping well   Cold during the day and warm at night. It disturbs her sleep     She thinks depression /anxiety are wrose  Palpitations when very griffith   Denies excessive sweating, tremors   No neck pain, denies compressive neck symptoms   No menstrual cycles - she has mirena   No plans to conceive     Family history of thyroid disorder: None     No recent iodine loading in form of contrast material for diagnostic studies/cardiac cath  Takes MVT prn. No Food supplements, herbs or medications including Biotin  No recent URTI      The following portions of the patient's history were reviewed and updated as appropriate:       Family History   Problem Relation Age of Onset    Asthma Mother     Diabetes Mother         Steroid induced    High Blood Pressure Father     No Known Problems Brother     No Known Problems Maternal Grandmother     No Known Problems Maternal Grandfather     No Known Problems Paternal Grandmother     No Known Problems

## 2024-06-24 LAB — THYROGLOB SERPL-MCNC: <0.5 NG/ML (ref 1.3–31.8)

## 2024-12-19 ENCOUNTER — OFFICE VISIT (OUTPATIENT)
Dept: ENDOCRINOLOGY | Age: 44
End: 2024-12-19
Payer: COMMERCIAL

## 2024-12-19 VITALS
RESPIRATION RATE: 16 BRPM | WEIGHT: 200.4 LBS | HEART RATE: 87 BPM | OXYGEN SATURATION: 98 % | BODY MASS INDEX: 35.51 KG/M2 | DIASTOLIC BLOOD PRESSURE: 87 MMHG | SYSTOLIC BLOOD PRESSURE: 134 MMHG | HEIGHT: 63 IN

## 2024-12-19 DIAGNOSIS — Z85.850 HISTORY OF THYROID CANCER: ICD-10-CM

## 2024-12-19 DIAGNOSIS — E89.0 POST-SURGICAL HYPOTHYROIDISM: ICD-10-CM

## 2024-12-19 DIAGNOSIS — Z92.3 HX OF RADIOACTIVE IODINE THYROID ABLATION: ICD-10-CM

## 2024-12-19 LAB
ANTI-THYROGLOB ABS: <15 IU/ML
T4 FREE SERPL-MCNC: 1.3 NG/DL (ref 0.9–1.8)
TSH SERPL DL<=0.005 MIU/L-ACNC: 2.65 UIU/ML (ref 0.27–4.2)

## 2024-12-19 PROCEDURE — 99214 OFFICE O/P EST MOD 30 MIN: CPT | Performed by: INTERNAL MEDICINE

## 2024-12-19 RX ORDER — LEVOTHYROXINE SODIUM 112 UG/1
TABLET ORAL
Qty: 90 TABLET | Refills: 1 | Status: SHIPPED | OUTPATIENT
Start: 2024-12-19

## 2024-12-19 NOTE — PROGRESS NOTES
well-nourished.   HENT:    Head: Normocephalic and atraumatic.     Eyes: Conjunctivae and EOM are normal.     Neck: Normal range of motion. Thyroid absent, scar in lower neck.   Cardiovascular: Normal rate, regular rhythm and normal heart sounds.    Pulmonary/Chest: Effort normal and breath sounds normal.   Musculoskeletal: Normal range of motion.   Neurological: Patient is alert and oriented to person, place, and time. Patient has normal reflexes. Very mild hand tremors present.   Skin: Skin is warm and dry.   Psychiatric: Patient has a normal mood and affect. Patient behavior is normal.       Lab Review:    Orders Only on 06/17/2024   Component Date Value Ref Range Status    TSH 06/17/2024 0.12 (L)  0.27 - 4.20 uIU/mL Final    T4 Free 06/17/2024 1.7  0.9 - 1.8 ng/dL Final    Anti-Thyroglob Abs 06/17/2024 14  <115 IU/mL Final    Thyroglobulin by LC-MS/MS, Serum/P* 06/17/2024 <0.5 (L)  1.3 - 31.8 ng/mL Final    T3, Free 06/17/2024 2.7  2.3 - 4.2 pg/mL Final   Orders Only on 04/03/2024   Component Date Value Ref Range Status    Sodium 04/03/2024 138  136 - 145 mmol/L Final    Potassium 04/03/2024 4.1  3.5 - 5.1 mmol/L Final    Chloride 04/03/2024 104  99 - 110 mmol/L Final    CO2 04/03/2024 23  21 - 32 mmol/L Final    Anion Gap 04/03/2024 11  3 - 16 Final    Glucose 04/03/2024 80  70 - 99 mg/dL Final    BUN 04/03/2024 8  7 - 20 mg/dL Final    Creatinine 04/03/2024 0.7  0.6 - 1.1 mg/dL Final    Est, Glom Filt Rate 04/03/2024 >90  >60 Final    Calcium 04/03/2024 8.7  8.3 - 10.6 mg/dL Final    Total Protein 04/03/2024 6.3 (L)  6.4 - 8.2 g/dL Final    Albumin 04/03/2024 4.0  3.4 - 5.0 g/dL Final    Albumin/Globulin Ratio 04/03/2024 1.7  1.1 - 2.2 Final    Total Bilirubin 04/03/2024 0.6  0.0 - 1.0 mg/dL Final    Alkaline Phosphatase 04/03/2024 76  40 - 129 U/L Final    ALT 04/03/2024 22  10 - 40 U/L Final    AST 04/03/2024 20  15 - 37 U/L Final    TSH 04/03/2024 6.26 (H)  0.27 - 4.20 uIU/mL Final    WBC 04/03/2024 11.7

## 2025-01-03 ENCOUNTER — HOSPITAL ENCOUNTER (OUTPATIENT)
Dept: ULTRASOUND IMAGING | Age: 45
Discharge: HOME | End: 2025-01-03
Payer: COMMERCIAL

## 2025-01-03 DIAGNOSIS — Z92.3 HX OF RADIOACTIVE IODINE THYROID ABLATION: ICD-10-CM

## 2025-01-03 DIAGNOSIS — Z85.850 HISTORY OF THYROID CANCER: ICD-10-CM

## 2025-01-03 DIAGNOSIS — E89.0 POST-SURGICAL HYPOTHYROIDISM: ICD-10-CM

## 2025-01-03 PROCEDURE — 76536 US EXAM OF HEAD AND NECK: CPT

## 2025-02-08 LAB — THYROGLOB SERPL-MCNC: <0.5 NG/ML (ref 1.3–31.8)

## 2025-02-14 ENCOUNTER — TELEMEDICINE (OUTPATIENT)
Age: 45
End: 2025-02-14
Payer: COMMERCIAL

## 2025-02-14 DIAGNOSIS — J20.9 ACUTE BRONCHITIS WITH SYMPTOMS > 10 DAYS: Primary | ICD-10-CM

## 2025-02-14 PROCEDURE — 99213 OFFICE O/P EST LOW 20 MIN: CPT | Performed by: NURSE PRACTITIONER

## 2025-02-14 RX ORDER — BENZONATATE 200 MG/1
200 CAPSULE ORAL 3 TIMES DAILY PRN
Qty: 30 CAPSULE | Refills: 1 | Status: SHIPPED | OUTPATIENT
Start: 2025-02-14

## 2025-02-14 RX ORDER — ALBUTEROL SULFATE 90 UG/1
2 INHALANT RESPIRATORY (INHALATION) 4 TIMES DAILY PRN
Qty: 18 G | Refills: 0 | Status: SHIPPED | OUTPATIENT
Start: 2025-02-14

## 2025-02-14 ASSESSMENT — ENCOUNTER SYMPTOMS
SHORTNESS OF BREATH: 1
DIARRHEA: 0
CHEST TIGHTNESS: 0
WHEEZING: 1
NAUSEA: 0
SINUS PRESSURE: 1
SORE THROAT: 1
SINUS PAIN: 0
VOMITING: 0
TROUBLE SWALLOWING: 0
COUGH: 1

## 2025-02-14 NOTE — PATIENT INSTRUCTIONS
Notify office if you do not improve or have worsening of condition.  Take medication as prescribed.   Drink plenty of fluids and rest.  Practice good hand hygiene.  May use Cepacol lozenges, or chloraseptic spray.  May sleep with humidifier as needed.  Gargle with warm salt water 3-4 times a day to help with sore throat. Warm tea with honey.  You can use ice, warm chicken noodle soup, soft foods, popsicles and cough drops to help soothe your throat.  May take Tylenol/Ibuprofen (alternate) as needed for fever/pain.  Do not eat or drink after anyone.   Do not share utensils.  Cover your mouth and nose when you cough or sneeze.  Follow up with PCP in 3-4 days if not improving or sooner if worsening.  Please refer to educational handout with AVS.

## 2025-02-14 NOTE — PROGRESS NOTES
[facility] Primary Care Virtualist Encounter Note       Chief Complaint   Patient presents with    Cold Symptoms     X7 days: Sore & itchy throat, cough with green mucus       HPI:    Jenelle Srinivasan (:  1980) has requested an audio/video evaluation for the following concern(s):    This is an established patient of Dr. Carson. This is the first time I am seeing the patient today. She requested this visit for ongoing and worsening symptoms >10 days. Family has been sick as well. First started with chills, fever, aches cold symptoms and felt she was slightly better fever and chills then fever returned low grade with cough green mucus worsening congestion PND, sore throat no white patches swollen glands, some wheezing and some SOB. No CP. No N/V/D. Unable to get into office.    Review of Systems   Constitutional:  Positive for fatigue and fever. Negative for chills.   HENT:  Positive for congestion, postnasal drip, sinus pressure and sore throat. Negative for sinus pain and trouble swallowing.    Respiratory:  Positive for cough, shortness of breath and wheezing. Negative for chest tightness.    Cardiovascular:  Negative for chest pain.   Gastrointestinal:  Negative for diarrhea, nausea and vomiting.   Musculoskeletal:  Positive for myalgias.   Neurological:  Positive for headaches.       Prior to Visit Medications    Medication Sig Taking? Authorizing Provider   albuterol sulfate HFA (VENTOLIN HFA) 108 (90 Base) MCG/ACT inhaler Inhale 2 puffs into the lungs 4 times daily as needed for Wheezing Yes Christina Adams APRN - CNP   benzonatate (TESSALON) 200 MG capsule Take 1 capsule by mouth 3 times daily as needed for Cough Yes Christina Adams APRN - CNP   amoxicillin-clavulanate (AUGMENTIN) 875-125 MG per tablet Take 1 tablet by mouth 2 times daily for 7 days Yes Christina Adams APRN - CNP   levothyroxine (SYNTHROID) 112 MCG tablet one tab six days a week and half on  Yes Matt

## 2025-02-17 ENCOUNTER — OFFICE VISIT (OUTPATIENT)
Dept: FAMILY MEDICINE CLINIC | Age: 45
End: 2025-02-17

## 2025-02-17 VITALS
TEMPERATURE: 97.5 F | OXYGEN SATURATION: 95 % | BODY MASS INDEX: 34.91 KG/M2 | DIASTOLIC BLOOD PRESSURE: 87 MMHG | HEIGHT: 63 IN | WEIGHT: 197 LBS | SYSTOLIC BLOOD PRESSURE: 128 MMHG

## 2025-02-17 DIAGNOSIS — R52 BODY ACHES: ICD-10-CM

## 2025-02-17 DIAGNOSIS — J40 BRONCHITIS: ICD-10-CM

## 2025-02-17 DIAGNOSIS — R53.83 OTHER FATIGUE: ICD-10-CM

## 2025-02-17 DIAGNOSIS — R50.9 FEVER, UNSPECIFIED FEVER CAUSE: Primary | ICD-10-CM

## 2025-02-17 DIAGNOSIS — R05.1 ACUTE COUGH: ICD-10-CM

## 2025-02-17 LAB
INFLUENZA A ANTIBODY: NORMAL
INFLUENZA B ANTIBODY: NORMAL
Lab: NORMAL
QC PASS/FAIL: NORMAL
SARS-COV-2 RDRP RESP QL NAA+PROBE: NEGATIVE

## 2025-02-17 RX ORDER — METHYLPREDNISOLONE 4 MG/1
TABLET ORAL
Qty: 1 KIT | Refills: 0 | Status: SHIPPED | OUTPATIENT
Start: 2025-02-17 | End: 2025-02-23

## 2025-02-17 SDOH — ECONOMIC STABILITY: FOOD INSECURITY: WITHIN THE PAST 12 MONTHS, THE FOOD YOU BOUGHT JUST DIDN'T LAST AND YOU DIDN'T HAVE MONEY TO GET MORE.: NEVER TRUE

## 2025-02-17 SDOH — ECONOMIC STABILITY: FOOD INSECURITY: WITHIN THE PAST 12 MONTHS, YOU WORRIED THAT YOUR FOOD WOULD RUN OUT BEFORE YOU GOT MONEY TO BUY MORE.: NEVER TRUE

## 2025-02-17 ASSESSMENT — ENCOUNTER SYMPTOMS
SORE THROAT: 1
CHOKING: 0
RHINORRHEA: 0
COLOR CHANGE: 0
CONSTIPATION: 0
BACK PAIN: 0
EYE ITCHING: 0
SHORTNESS OF BREATH: 0
ABDOMINAL PAIN: 0
NAUSEA: 0
WHEEZING: 0
SINUS PAIN: 0
TROUBLE SWALLOWING: 0
DIARRHEA: 0
EYE PAIN: 0
VOICE CHANGE: 0
STRIDOR: 0
SINUS PRESSURE: 1
PHOTOPHOBIA: 0
COUGH: 1
VOMITING: 0
BLOOD IN STOOL: 0
EYE DISCHARGE: 0
EYE REDNESS: 0
CHEST TIGHTNESS: 0

## 2025-02-17 ASSESSMENT — PATIENT HEALTH QUESTIONNAIRE - PHQ9
2. FEELING DOWN, DEPRESSED OR HOPELESS: NOT AT ALL
SUM OF ALL RESPONSES TO PHQ QUESTIONS 1-9: 0
SUM OF ALL RESPONSES TO PHQ QUESTIONS 1-9: 0
1. LITTLE INTEREST OR PLEASURE IN DOING THINGS: NOT AT ALL
SUM OF ALL RESPONSES TO PHQ QUESTIONS 1-9: 0
SUM OF ALL RESPONSES TO PHQ9 QUESTIONS 1 & 2: 0
SUM OF ALL RESPONSES TO PHQ QUESTIONS 1-9: 0

## 2025-02-17 NOTE — PROGRESS NOTES
(MEDROL DOSEPACK) 4 MG tablet; Take by mouth., Disp-1 kit, R-0Normal  3. Body aches  -     POCT COVID-19 Rapid, NAAT  -     POCT Influenza A/B  4. Other fatigue  5. Bronchitis  -     methylPREDNISolone (MEDROL DOSEPACK) 4 MG tablet; Take by mouth., Disp-1 kit, R-0Normal       Recommended saltwater gargles, warm fluids with honey and a humidifier at night. Flonase, Zyrtec, NSAIDS as needed.  Follow up if symptoms worsen or do not improve.

## 2025-03-06 ENCOUNTER — OFFICE VISIT (OUTPATIENT)
Dept: FAMILY MEDICINE CLINIC | Age: 45
End: 2025-03-06
Payer: COMMERCIAL

## 2025-03-06 ENCOUNTER — HOSPITAL ENCOUNTER (OUTPATIENT)
Dept: GENERAL RADIOLOGY | Age: 45
Discharge: HOME | End: 2025-03-06
Payer: COMMERCIAL

## 2025-03-06 VITALS
HEART RATE: 87 BPM | OXYGEN SATURATION: 95 % | SYSTOLIC BLOOD PRESSURE: 126 MMHG | HEIGHT: 63 IN | DIASTOLIC BLOOD PRESSURE: 84 MMHG | WEIGHT: 206 LBS | BODY MASS INDEX: 36.5 KG/M2

## 2025-03-06 DIAGNOSIS — R06.2 WHEEZING: ICD-10-CM

## 2025-03-06 DIAGNOSIS — R05.1 ACUTE COUGH: ICD-10-CM

## 2025-03-06 DIAGNOSIS — R05.1 ACUTE COUGH: Primary | ICD-10-CM

## 2025-03-06 PROCEDURE — 99214 OFFICE O/P EST MOD 30 MIN: CPT | Performed by: NURSE PRACTITIONER

## 2025-03-06 PROCEDURE — 71046 X-RAY EXAM CHEST 2 VIEWS: CPT

## 2025-03-06 RX ORDER — DOXYCYCLINE HYCLATE 100 MG
100 TABLET ORAL 2 TIMES DAILY
Qty: 14 TABLET | Refills: 0 | Status: SHIPPED | OUTPATIENT
Start: 2025-03-06 | End: 2025-03-13

## 2025-03-06 RX ORDER — BUDESONIDE, GLYCOPYRROLATE, AND FORMOTEROL FUMARATE 160; 9; 4.8 UG/1; UG/1; UG/1
2 AEROSOL, METERED RESPIRATORY (INHALATION) 2 TIMES DAILY
Qty: 1 EACH | Refills: 0 | Status: SHIPPED | COMMUNITY
Start: 2025-03-06 | End: 2025-03-16

## 2025-03-06 ASSESSMENT — ENCOUNTER SYMPTOMS
DIARRHEA: 0
SHORTNESS OF BREATH: 1
CHOKING: 0
BLOOD IN STOOL: 0
COUGH: 1
VOMITING: 0
STRIDOR: 0
EYE REDNESS: 0
EYE PAIN: 0
PHOTOPHOBIA: 0
SINUS PAIN: 0
SORE THROAT: 0
VOICE CHANGE: 0
RHINORRHEA: 0
EYE ITCHING: 0
WHEEZING: 1
ABDOMINAL PAIN: 0
COLOR CHANGE: 0
TROUBLE SWALLOWING: 0
CONSTIPATION: 0
EYE DISCHARGE: 0
SINUS PRESSURE: 1
CHEST TIGHTNESS: 1
BACK PAIN: 0
NAUSEA: 0

## 2025-03-06 NOTE — PROGRESS NOTES
Chief Complaint   Patient presents with    Cough     Onset more than a month    Chest Congestion       /84   Pulse 87   Ht 1.6 m (5' 3\")   Wt 93.4 kg (206 lb)   SpO2 95%   BMI 36.49 kg/m²     HPI:  Jenelle Srinivasan is a 44 y.o. (: 1980) here today   for   HPI    Patient's medications, allergies, past medical, surgical, social and family histories were reviewed and updated as appropriate.    Cough: started a month ago.  95% in office on room air.     Chest congestion:  Started on Augmentin , slightly better, getting worse, saw me and gave steroid, didn't improve with steroid.       ROS:  Review of Systems   Constitutional:  Positive for activity change. Negative for appetite change, chills, diaphoresis, fatigue, fever and unexpected weight change.   HENT:  Positive for congestion and sinus pressure. Negative for ear discharge, ear pain, hearing loss, nosebleeds, postnasal drip, rhinorrhea, sinus pain, sneezing, sore throat, tinnitus, trouble swallowing and voice change.    Eyes:  Negative for photophobia, pain, discharge, redness and itching.   Respiratory:  Positive for cough, chest tightness, shortness of breath and wheezing. Negative for choking and stridor.    Cardiovascular:  Negative for chest pain, palpitations and leg swelling.   Gastrointestinal:  Negative for abdominal pain, blood in stool, constipation, diarrhea, nausea and vomiting.   Endocrine: Negative for cold intolerance, heat intolerance, polydipsia and polyuria.   Genitourinary:  Negative for difficulty urinating, dysuria, enuresis, flank pain, frequency, hematuria and urgency.   Musculoskeletal:  Negative for back pain, gait problem, joint swelling, neck pain and neck stiffness.   Skin:  Negative for color change, pallor, rash and wound.   Allergic/Immunologic: Negative for environmental allergies and food allergies.   Neurological:  Negative for dizziness, tremors, syncope, speech difficulty, weakness, light-headedness,

## 2025-03-08 DIAGNOSIS — J20.9 ACUTE BRONCHITIS WITH SYMPTOMS > 10 DAYS: ICD-10-CM

## 2025-03-10 RX ORDER — ALBUTEROL SULFATE 90 UG/1
2 INHALANT RESPIRATORY (INHALATION) 4 TIMES DAILY PRN
Qty: 6.7 EACH | Refills: 2 | Status: SHIPPED | OUTPATIENT
Start: 2025-03-10

## 2025-07-01 ENCOUNTER — OFFICE VISIT (OUTPATIENT)
Dept: ENDOCRINOLOGY | Age: 45
End: 2025-07-01
Payer: COMMERCIAL

## 2025-07-01 VITALS
WEIGHT: 201.4 LBS | SYSTOLIC BLOOD PRESSURE: 144 MMHG | OXYGEN SATURATION: 96 % | DIASTOLIC BLOOD PRESSURE: 97 MMHG | HEART RATE: 78 BPM | BODY MASS INDEX: 35.68 KG/M2 | HEIGHT: 63 IN

## 2025-07-01 DIAGNOSIS — Z85.850 HISTORY OF THYROID CANCER: ICD-10-CM

## 2025-07-01 DIAGNOSIS — E89.0 POST-SURGICAL HYPOTHYROIDISM: Primary | ICD-10-CM

## 2025-07-01 DIAGNOSIS — E89.0 POST-SURGICAL HYPOTHYROIDISM: ICD-10-CM

## 2025-07-01 DIAGNOSIS — Z92.3 HX OF RADIOACTIVE IODINE THYROID ABLATION: ICD-10-CM

## 2025-07-01 LAB
T4 FREE SERPL-MCNC: 1.3 NG/DL (ref 0.9–1.8)
TSH SERPL DL<=0.005 MIU/L-ACNC: 0.89 UIU/ML (ref 0.27–4.2)

## 2025-07-01 PROCEDURE — 99214 OFFICE O/P EST MOD 30 MIN: CPT | Performed by: INTERNAL MEDICINE

## 2025-07-01 RX ORDER — LEVOTHYROXINE SODIUM 112 UG/1
TABLET ORAL
Qty: 90 TABLET | Refills: 1 | Status: SHIPPED | OUTPATIENT
Start: 2025-07-01 | End: 2025-07-01 | Stop reason: SDUPTHER

## 2025-07-01 RX ORDER — LEVOTHYROXINE SODIUM 112 UG/1
TABLET ORAL
Qty: 90 TABLET | Refills: 3 | Status: SHIPPED | OUTPATIENT
Start: 2025-07-01

## 2025-07-01 NOTE — PROGRESS NOTES
Patient ID: Jenelle Srinivasan is a 45 y.o. 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     2014: She had total thyroidectomy in [place] ([facility])   LEE in 2014     She was seeing endocrinologist in [place]. Moved in Nov 2020.   Reviewed records from [facility]. Most of them were from GYN practice.    The note suggested she had a left sided 1.6 x 1 cms nodule   US neck in Dec 2015 did not show any residual thyroid mass, no abnormal lymph nodes   US neck in March 2017 did not show any residual thyroid mass, no abnormal lymph nodes   Tg < 0.1, anti Tg antibody < 1 in May 2019 when TSH was 26.42     Takes Levothyroxine 112 mcg (one tab six days a week and half on Sunday) in morning empty stomach with water and waits for > 30 minutes.   She is missing Sunday tabs sometimes     Energy levels are better in am. lower in the afternoon    Weight is stable . Not doing intermittent fasting     Poor sleep, difficult to stop thinking and worrying   Tremors of right hand   Cold during the day and warm at night. It disturbs her sleep     She thinks depression   Palpitations sometimes     Denies excessive sweating  No neck pain, denies compressive neck symptoms   Irregular menstrual cycles   No plans to conceive     No recent URTI     FOV:     Family history of thyroid disorder: None     No recent iodine loading in form of contrast material for diagnostic studies/cardiac cath  Takes MVT prn. No Food supplements, herbs or medications including Biotin         The following portions of the patient's history were reviewed and updated as appropriate:       Family History   Problem Relation Age of Onset    Asthma Mother     Diabetes Mother         Steroid induced    High Blood Pressure Father     No Known Problems Brother     No Known Problems Maternal Grandmother     No Known

## 2025-07-02 ENCOUNTER — RESULTS FOLLOW-UP (OUTPATIENT)
Dept: ENDOCRINOLOGY | Age: 45
End: 2025-07-02

## 2025-08-15 ENCOUNTER — OFFICE VISIT (OUTPATIENT)
Dept: FAMILY MEDICINE CLINIC | Age: 45
End: 2025-08-15
Payer: COMMERCIAL

## 2025-08-15 VITALS
OXYGEN SATURATION: 96 % | HEIGHT: 63 IN | HEART RATE: 81 BPM | BODY MASS INDEX: 35.61 KG/M2 | DIASTOLIC BLOOD PRESSURE: 82 MMHG | WEIGHT: 201 LBS | SYSTOLIC BLOOD PRESSURE: 120 MMHG

## 2025-08-15 DIAGNOSIS — Z00.00 WELL ADULT EXAM: Primary | ICD-10-CM

## 2025-08-15 DIAGNOSIS — Z00.00 WELL ADULT EXAM: ICD-10-CM

## 2025-08-15 DIAGNOSIS — R07.9 CHEST PAIN, UNSPECIFIED TYPE: ICD-10-CM

## 2025-08-15 DIAGNOSIS — F33.0 MILD EPISODE OF RECURRENT MAJOR DEPRESSIVE DISORDER: ICD-10-CM

## 2025-08-15 DIAGNOSIS — Z12.11 COLON CANCER SCREENING: ICD-10-CM

## 2025-08-15 LAB
DEPRECATED RDW RBC AUTO: 13.9 % (ref 12.4–15.4)
HCT VFR BLD AUTO: 41.3 % (ref 36–48)
HGB BLD-MCNC: 13.5 G/DL (ref 12–16)
MCH RBC QN AUTO: 27.4 PG (ref 26–34)
MCHC RBC AUTO-ENTMCNC: 32.6 G/DL (ref 31–36)
MCV RBC AUTO: 84 FL (ref 80–100)
PLATELET # BLD AUTO: 297 K/UL (ref 135–450)
PMV BLD AUTO: 7.9 FL (ref 5–10.5)
RBC # BLD AUTO: 4.92 M/UL (ref 4–5.2)
WBC # BLD AUTO: 9.2 K/UL (ref 4–11)

## 2025-08-15 PROCEDURE — 99396 PREV VISIT EST AGE 40-64: CPT | Performed by: FAMILY MEDICINE

## 2025-08-15 ASSESSMENT — PATIENT HEALTH QUESTIONNAIRE - PHQ9
9. THOUGHTS THAT YOU WOULD BE BETTER OFF DEAD, OR OF HURTING YOURSELF: NOT AT ALL
2. FEELING DOWN, DEPRESSED OR HOPELESS: NEARLY EVERY DAY
3. TROUBLE FALLING OR STAYING ASLEEP: NEARLY EVERY DAY
SUM OF ALL RESPONSES TO PHQ QUESTIONS 1-9: 14
7. TROUBLE CONCENTRATING ON THINGS, SUCH AS READING THE NEWSPAPER OR WATCHING TELEVISION: NOT AT ALL
6. FEELING BAD ABOUT YOURSELF - OR THAT YOU ARE A FAILURE OR HAVE LET YOURSELF OR YOUR FAMILY DOWN: NEARLY EVERY DAY
SUM OF ALL RESPONSES TO PHQ QUESTIONS 1-9: 14
8. MOVING OR SPEAKING SO SLOWLY THAT OTHER PEOPLE COULD HAVE NOTICED. OR THE OPPOSITE, BEING SO FIGETY OR RESTLESS THAT YOU HAVE BEEN MOVING AROUND A LOT MORE THAN USUAL: NOT AT ALL
4. FEELING TIRED OR HAVING LITTLE ENERGY: NEARLY EVERY DAY
1. LITTLE INTEREST OR PLEASURE IN DOING THINGS: NOT AT ALL
5. POOR APPETITE OR OVEREATING: MORE THAN HALF THE DAYS
10. IF YOU CHECKED OFF ANY PROBLEMS, HOW DIFFICULT HAVE THESE PROBLEMS MADE IT FOR YOU TO DO YOUR WORK, TAKE CARE OF THINGS AT HOME, OR GET ALONG WITH OTHER PEOPLE: VERY DIFFICULT
SUM OF ALL RESPONSES TO PHQ QUESTIONS 1-9: 14
SUM OF ALL RESPONSES TO PHQ QUESTIONS 1-9: 14

## 2025-08-16 LAB
25(OH)D3 SERPL-MCNC: 26.6 NG/ML
ALBUMIN SERPL-MCNC: 4.2 G/DL (ref 3.4–5)
ALBUMIN/GLOB SERPL: 1.6 {RATIO} (ref 1.1–2.2)
ALP SERPL-CCNC: 78 U/L (ref 40–129)
ALT SERPL-CCNC: 20 U/L (ref 10–40)
ANION GAP SERPL CALCULATED.3IONS-SCNC: 12 MMOL/L (ref 3–16)
AST SERPL-CCNC: 19 U/L (ref 15–37)
BILIRUB SERPL-MCNC: 0.6 MG/DL (ref 0–1)
BUN SERPL-MCNC: 14 MG/DL (ref 7–20)
CALCIUM SERPL-MCNC: 9 MG/DL (ref 8.3–10.6)
CHLORIDE SERPL-SCNC: 103 MMOL/L (ref 99–110)
CHOLEST SERPL-MCNC: 236 MG/DL (ref 0–199)
CO2 SERPL-SCNC: 24 MMOL/L (ref 21–32)
CREAT SERPL-MCNC: 0.7 MG/DL (ref 0.6–1.1)
GFR SERPLBLD CREATININE-BSD FMLA CKD-EPI: >90 ML/MIN/{1.73_M2}
GLUCOSE SERPL-MCNC: 90 MG/DL (ref 70–99)
HDLC SERPL-MCNC: 60 MG/DL (ref 40–60)
LDLC SERPL CALC-MCNC: 143 MG/DL
POTASSIUM SERPL-SCNC: 4.5 MMOL/L (ref 3.5–5.1)
PROT SERPL-MCNC: 6.9 G/DL (ref 6.4–8.2)
SODIUM SERPL-SCNC: 139 MMOL/L (ref 136–145)
TRIGL SERPL-MCNC: 166 MG/DL (ref 0–150)
VLDLC SERPL CALC-MCNC: 33 MG/DL